# Patient Record
Sex: FEMALE | Race: WHITE | NOT HISPANIC OR LATINO | Employment: FULL TIME | RURAL
[De-identification: names, ages, dates, MRNs, and addresses within clinical notes are randomized per-mention and may not be internally consistent; named-entity substitution may affect disease eponyms.]

---

## 2017-01-23 ENCOUNTER — PROCEDURE VISIT (OUTPATIENT)
Dept: FAMILY MEDICINE CLINIC | Facility: CLINIC | Age: 57
End: 2017-01-23

## 2017-01-23 VITALS
TEMPERATURE: 97.9 F | HEIGHT: 66 IN | HEART RATE: 77 BPM | WEIGHT: 220 LBS | SYSTOLIC BLOOD PRESSURE: 146 MMHG | DIASTOLIC BLOOD PRESSURE: 90 MMHG | RESPIRATION RATE: 16 BRPM | BODY MASS INDEX: 35.36 KG/M2

## 2017-01-23 DIAGNOSIS — Z23 NEED FOR VACCINATION: ICD-10-CM

## 2017-01-23 DIAGNOSIS — Z12.4 PAP SMEAR FOR CERVICAL CANCER SCREENING: ICD-10-CM

## 2017-01-23 DIAGNOSIS — Z11.59 NEED FOR HEPATITIS C SCREENING TEST: ICD-10-CM

## 2017-01-23 DIAGNOSIS — M79.601 RIGHT ARM PAIN: Primary | ICD-10-CM

## 2017-01-23 PROCEDURE — 99213 OFFICE O/P EST LOW 20 MIN: CPT | Performed by: NURSE PRACTITIONER

## 2017-01-23 PROCEDURE — 36415 COLL VENOUS BLD VENIPUNCTURE: CPT | Performed by: NURSE PRACTITIONER

## 2017-01-23 PROCEDURE — 99214 OFFICE O/P EST MOD 30 MIN: CPT | Performed by: NURSE PRACTITIONER

## 2017-01-23 PROCEDURE — 90471 IMMUNIZATION ADMIN: CPT | Performed by: NURSE PRACTITIONER

## 2017-01-23 PROCEDURE — 90715 TDAP VACCINE 7 YRS/> IM: CPT | Performed by: NURSE PRACTITIONER

## 2017-01-23 PROCEDURE — 88142 CYTOPATH C/V THIN LAYER: CPT | Performed by: NURSE PRACTITIONER

## 2017-01-23 PROCEDURE — 86803 HEPATITIS C AB TEST: CPT | Performed by: NURSE PRACTITIONER

## 2017-01-23 RX ORDER — METHYLPREDNISOLONE 4 MG/1
TABLET ORAL
Qty: 21 TABLET | Refills: 0 | Status: SHIPPED | OUTPATIENT
Start: 2017-01-23 | End: 2017-10-02

## 2017-01-23 NOTE — PROGRESS NOTES
Subjective   History of Present Illness    Goldie Kraus is a 56 y.o. female who presents for annual exam.  She also has c/o right shoulder and upper arm pain.  Obstetric History:   5, para 5, living children 5, full term 5   Menstrual History:     Patient's last menstrual period was 2010.       Sexual History:   sexually active      Current contraception: none  History of abnormal Pap smear: yes - had ascus and was repeated and was normal on repeat.  SILAS exposure in utero: no  Received Gardasil immunization: no  Perform regular self breast exam: yes - monthly  Family history of uterine or ovarian cancer: no  Family History of cervical cancer: no  Family History of colon cancer/colon polyps: yes - possible colon cancer in grandfather  Regular self breast exam: yes  History of abnormal mammogram: no  Family history of breast cancer: no  History of abnormal lipids: no    The following portions of the patient's history were reviewed and updated as appropriate: allergies, current medications, past family history, past medical history, past social history, past surgical history and problem list.    Review of Systems   Constitutional: Negative.    Respiratory: Negative.    Cardiovascular: Negative.    Gastrointestinal: Negative.    Genitourinary: Negative for pelvic pain, vaginal discharge and vaginal pain.   Musculoskeletal: Positive for arthralgias (right shoulder and upper arm pain).   Skin: Negative.    Neurological: Negative.    Psychiatric/Behavioral: Negative.        Pertinent items are noted in HPI.     Objective   Physical Exam   Constitutional: She appears well-developed and well-nourished. No distress.   Genitourinary: Rectal exam shows guaiac negative stool.   Musculoskeletal:        Right shoulder: She exhibits decreased range of motion, tenderness, crepitus and pain. She exhibits no swelling, no effusion and no deformity.   Nursing note and vitals reviewed.      Visit Vitals   • /90   •  "Pulse 77   • Temp 97.9 °F (36.6 °C)   • Resp 16   • Ht 66\" (167.6 cm)   • Wt 220 lb (99.8 kg)   • LMP 05/23/2010   • BMI 35.51 kg/m2       General:   alert, appears stated age and cooperative   Heart: regular rate and rhythm, S1, S2 normal, no murmur, click, rub or gallop   Lungs: clear to auscultation bilaterally   Abdomen: soft, non-tender, without masses or organomegaly   Breast: inspection negative, no nipple discharge or bleeding, no masses or nodularity palpable   Vulva: normal   Vagina: normal mucosa   Cervix: no lesions   Uterus: normal size   Adnexa: normal adnexa     Assessment/Plan   Goldie was seen today for gynecologic exam.    Diagnoses and all orders for this visit:    Right arm pain  -     MethylPREDNISolone (MEDROL, LAZARO,) 4 MG tablet; Take as directed on package instructions.    Need for hepatitis C screening test  -     Hepatitis C Antibody    Need for vaccination  -     Tdap Vaccine Greater Than or Equal To 6yo IM      gyn exam is completed.  she will be scheduled for a mammogram and she will be given medrol for her arm/shoulder pain             "

## 2017-01-24 LAB
HCV AB SER DONR QL: NEGATIVE
HCV S/C RATIO: 0.02 (ref 0–0.89)

## 2017-01-26 LAB
HPV REFLEX?: NORMAL
LAB AP CASE REPORT: NORMAL
LAB AP GYN OTHER FINDINGS: NORMAL
Lab: NORMAL
PATH INTERP SPEC-IMP: NORMAL
STAT OF ADQ CVX/VAG CYTO-IMP: NORMAL

## 2017-03-23 RX ORDER — OMEPRAZOLE 40 MG/1
CAPSULE, DELAYED RELEASE ORAL
Qty: 30 CAPSULE | Refills: 3 | Status: SHIPPED | OUTPATIENT
Start: 2017-03-23 | End: 2017-10-02 | Stop reason: SDUPTHER

## 2017-04-12 RX ORDER — LOSARTAN POTASSIUM 50 MG/1
50 TABLET ORAL DAILY
Qty: 30 TABLET | Refills: 3 | Status: SHIPPED | OUTPATIENT
Start: 2017-04-12 | End: 2017-10-02 | Stop reason: SDUPTHER

## 2017-08-01 RX ORDER — LOSARTAN POTASSIUM 50 MG/1
TABLET ORAL
Qty: 30 TABLET | Refills: 3 | OUTPATIENT
Start: 2017-08-01

## 2017-08-07 ENCOUNTER — TELEPHONE (OUTPATIENT)
Dept: FAMILY MEDICINE CLINIC | Facility: CLINIC | Age: 57
End: 2017-08-07

## 2017-09-01 RX ORDER — OMEPRAZOLE 40 MG/1
CAPSULE, DELAYED RELEASE ORAL
Qty: 30 CAPSULE | Refills: 3 | OUTPATIENT
Start: 2017-09-01

## 2017-09-12 RX ORDER — OMEPRAZOLE 40 MG/1
CAPSULE, DELAYED RELEASE ORAL
Qty: 30 CAPSULE | Refills: 3 | OUTPATIENT
Start: 2017-09-12

## 2017-09-12 RX ORDER — LOSARTAN POTASSIUM 50 MG/1
TABLET ORAL
Qty: 30 TABLET | Refills: 3 | OUTPATIENT
Start: 2017-09-12

## 2017-10-02 ENCOUNTER — OFFICE VISIT (OUTPATIENT)
Dept: FAMILY MEDICINE CLINIC | Facility: CLINIC | Age: 57
End: 2017-10-02

## 2017-10-02 VITALS
HEIGHT: 66 IN | DIASTOLIC BLOOD PRESSURE: 72 MMHG | HEART RATE: 107 BPM | WEIGHT: 217 LBS | RESPIRATION RATE: 16 BRPM | TEMPERATURE: 98.7 F | OXYGEN SATURATION: 98 % | SYSTOLIC BLOOD PRESSURE: 127 MMHG | BODY MASS INDEX: 34.87 KG/M2

## 2017-10-02 DIAGNOSIS — M54.2 NECK PAIN: Primary | ICD-10-CM

## 2017-10-02 DIAGNOSIS — I10 ESSENTIAL HYPERTENSION: ICD-10-CM

## 2017-10-02 DIAGNOSIS — K21.9 GASTROESOPHAGEAL REFLUX DISEASE, ESOPHAGITIS PRESENCE NOT SPECIFIED: ICD-10-CM

## 2017-10-02 PROCEDURE — 99214 OFFICE O/P EST MOD 30 MIN: CPT | Performed by: NURSE PRACTITIONER

## 2017-10-02 RX ORDER — OMEPRAZOLE 40 MG/1
40 CAPSULE, DELAYED RELEASE ORAL DAILY
Qty: 30 CAPSULE | Refills: 3 | Status: SHIPPED | OUTPATIENT
Start: 2017-10-02 | End: 2018-01-26 | Stop reason: SDUPTHER

## 2017-10-02 RX ORDER — TIZANIDINE HYDROCHLORIDE 2 MG/1
2 CAPSULE, GELATIN COATED ORAL 3 TIMES DAILY
Qty: 90 CAPSULE | Refills: 1 | Status: SHIPPED | OUTPATIENT
Start: 2017-10-02 | End: 2017-10-05 | Stop reason: CLARIF

## 2017-10-02 RX ORDER — LOSARTAN POTASSIUM 50 MG/1
50 TABLET ORAL DAILY
Qty: 30 TABLET | Refills: 5 | Status: SHIPPED | OUTPATIENT
Start: 2017-10-02 | End: 2018-03-28 | Stop reason: SDUPTHER

## 2017-10-02 RX ORDER — DICLOFENAC SODIUM 75 MG/1
75 TABLET, DELAYED RELEASE ORAL 2 TIMES DAILY
Qty: 60 TABLET | Refills: 3 | Status: SHIPPED | OUTPATIENT
Start: 2017-10-02 | End: 2018-08-17 | Stop reason: SDUPTHER

## 2017-10-02 NOTE — PROGRESS NOTES
Subjective   Goldie Kraus is a 57 y.o. female.     HPI Comments: Here today stating that she has chronic neck pain that radiates down into her right shoulder.  Has been going on for the past 10+ years.  Is not taking anything for sx at present.  Also she needs refills on her meds.    Hypertension   This is a chronic problem. The current episode started more than 1 year ago. The problem is unchanged. The problem is controlled. Associated symptoms include neck pain. Pertinent negatives include no anxiety, blurred vision, chest pain, headaches, malaise/fatigue, orthopnea, palpitations, peripheral edema, PND, shortness of breath or sweats. There are no associated agents to hypertension. Risk factors for coronary artery disease include obesity, post-menopausal state, sedentary lifestyle and smoking/tobacco exposure. Past treatments include angiotensin blockers. The current treatment provides significant improvement. There are no compliance problems.  There is no history of angina, kidney disease, CAD/MI, CVA, heart failure, left ventricular hypertrophy, PVD or retinopathy.   Neck Pain    This is a chronic problem. The current episode started more than 1 year ago. The problem occurs constantly. The problem has been unchanged. The pain is present in the anterior neck. The quality of the pain is described as aching. The pain is at a severity of 5/10. The pain is moderate. Nothing aggravates the symptoms. The pain is same all the time. Stiffness is present in the morning. Pertinent negatives include no chest pain, fever, headaches, leg pain, numbness, pain with swallowing, paresis, photophobia, syncope, tingling, trouble swallowing, visual change, weakness or weight loss. She has tried nothing for the symptoms. The treatment provided no relief.        The following portions of the patient's history were reviewed and updated as appropriate: allergies, current medications, past family history, past medical history, past  social history, past surgical history and problem list.    Review of Systems   Constitutional: Negative.  Negative for fever, malaise/fatigue and weight loss.   HENT: Negative.  Negative for trouble swallowing.    Eyes: Negative for blurred vision and photophobia.   Respiratory: Negative.  Negative for shortness of breath.    Cardiovascular: Negative.  Negative for chest pain, palpitations, orthopnea, syncope and PND.   Musculoskeletal: Positive for neck pain.   Skin: Negative.    Neurological: Negative.  Negative for tingling, weakness, numbness and headaches.   Psychiatric/Behavioral: Negative.        Objective   Physical Exam   Constitutional: She is oriented to person, place, and time. She appears well-developed and well-nourished. No distress.   HENT:   Head: Normocephalic.   Eyes: Pupils are equal, round, and reactive to light.   Neck: Normal range of motion. Neck supple. No thyromegaly present.   Cardiovascular: Normal rate, regular rhythm and normal heart sounds.  Exam reveals no friction rub.    No murmur heard.  Pulmonary/Chest: Effort normal and breath sounds normal. No respiratory distress. She has no wheezes. She has no rales.   Abdominal: Soft.   Musculoskeletal:        Cervical back: She exhibits decreased range of motion, pain and spasm. She exhibits no tenderness.   X rays are reviewed and show degenerative changes.   Neurological: She is alert and oriented to person, place, and time.   Skin: Skin is warm and dry.   Psychiatric: She has a normal mood and affect. Thought content normal.   Nursing note and vitals reviewed.      Assessment/Plan   Goldie was seen today for right shoulder pain, hypertension and heartburn.    Diagnoses and all orders for this visit:    Neck pain  -     XR Spine Cervical Complete 4 or 5 View (In Office)  -     diclofenac (VOLTAREN) 75 MG EC tablet; Take 1 tablet by mouth 2 (Two) Times a Day.  -     TiZANidine (ZANAFLEX) 2 MG capsule; Take 1 capsule by mouth 3 (Three) Times  a Day.  -     Ambulatory Referral to Physical Therapy Evaluate and treat    Essential hypertension  -     losartan (COZAAR) 50 MG tablet; Take 1 tablet by mouth Daily.  -     Comprehensive Metabolic Panel; Future  -     Lipid panel; Future    Gastroesophageal reflux disease, esophagitis presence not specified  -     omeprazole (priLOSEC) 40 MG capsule; Take 1 capsule by mouth Daily.

## 2017-10-03 ENCOUNTER — TELEPHONE (OUTPATIENT)
Dept: FAMILY MEDICINE CLINIC | Facility: CLINIC | Age: 57
End: 2017-10-03

## 2017-10-05 RX ORDER — CYCLOBENZAPRINE HCL 5 MG
5 TABLET ORAL 3 TIMES DAILY PRN
Qty: 90 TABLET | Refills: 3 | Status: SHIPPED | OUTPATIENT
Start: 2017-10-05 | End: 2017-12-07 | Stop reason: DRUGHIGH

## 2017-10-11 ENCOUNTER — APPOINTMENT (OUTPATIENT)
Dept: PHYSICAL THERAPY | Facility: HOSPITAL | Age: 57
End: 2017-10-11

## 2017-10-16 ENCOUNTER — LAB (OUTPATIENT)
Dept: LAB | Facility: CLINIC | Age: 57
End: 2017-10-16

## 2017-10-16 DIAGNOSIS — I10 ESSENTIAL HYPERTENSION: ICD-10-CM

## 2017-10-16 LAB
ALBUMIN SERPL-MCNC: 4.1 G/DL (ref 3.4–4.8)
ALBUMIN/GLOB SERPL: 1.6 G/DL (ref 1.1–1.8)
ALP SERPL-CCNC: 96 U/L (ref 38–126)
ALT SERPL W P-5'-P-CCNC: 23 U/L (ref 9–52)
ANION GAP SERPL CALCULATED.3IONS-SCNC: 10 MMOL/L (ref 5–15)
ARTICHOKE IGE QN: 214 MG/DL (ref 1–129)
AST SERPL-CCNC: 16 U/L (ref 14–36)
BILIRUB SERPL-MCNC: 1 MG/DL (ref 0.2–1.3)
BUN BLD-MCNC: 14 MG/DL (ref 7–21)
BUN/CREAT SERPL: 15.7 (ref 7–25)
CALCIUM SPEC-SCNC: 9.7 MG/DL (ref 8.4–10.2)
CHLORIDE SERPL-SCNC: 104 MMOL/L (ref 95–110)
CHOLEST SERPL-MCNC: 278 MG/DL (ref 0–199)
CO2 SERPL-SCNC: 25 MMOL/L (ref 22–31)
CREAT BLD-MCNC: 0.89 MG/DL (ref 0.5–1)
GFR SERPL CREATININE-BSD FRML MDRD: 66 ML/MIN/1.73 (ref 51–120)
GLOBULIN UR ELPH-MCNC: 2.5 GM/DL (ref 2.3–3.5)
GLUCOSE BLD-MCNC: 93 MG/DL (ref 60–100)
HDLC SERPL-MCNC: 45 MG/DL (ref 60–200)
LDLC/HDLC SERPL: 4.57 {RATIO} (ref 0–3.22)
POTASSIUM BLD-SCNC: 4.9 MMOL/L (ref 3.5–5.1)
PROT SERPL-MCNC: 6.6 G/DL (ref 6.3–8.6)
SODIUM BLD-SCNC: 139 MMOL/L (ref 137–145)
TRIGL SERPL-MCNC: 137 MG/DL (ref 20–199)

## 2017-10-16 PROCEDURE — 80061 LIPID PANEL: CPT | Performed by: NURSE PRACTITIONER

## 2017-10-16 PROCEDURE — 36415 COLL VENOUS BLD VENIPUNCTURE: CPT | Performed by: NURSE PRACTITIONER

## 2017-10-16 PROCEDURE — 80053 COMPREHEN METABOLIC PANEL: CPT | Performed by: NURSE PRACTITIONER

## 2017-10-17 ENCOUNTER — HOSPITAL ENCOUNTER (OUTPATIENT)
Dept: PHYSICAL THERAPY | Facility: HOSPITAL | Age: 57
Setting detail: THERAPIES SERIES
Discharge: HOME | End: 2017-10-17

## 2017-10-17 DIAGNOSIS — M79.601 RIGHT ARM PAIN: Primary | ICD-10-CM

## 2017-10-17 DIAGNOSIS — M54.2 NECK PAIN: ICD-10-CM

## 2017-10-17 PROCEDURE — 97110 THERAPEUTIC EXERCISES: CPT | Performed by: PHYSICAL THERAPIST

## 2017-10-17 PROCEDURE — 97162 PT EVAL MOD COMPLEX 30 MIN: CPT | Performed by: PHYSICAL THERAPIST

## 2017-10-17 NOTE — THERAPY EVALUATION
Outpatient Physical Therapy Ortho Initial Evaluation  [facility]  Marjan Tenorio, PT, DPT, CSCS       Patient Name: Goldie Kraus  : 1960  MRN: [id number]  Today's Date: 10/17/2017      Visit Date: 10/17/2017     Pt reports 0/10 pain pre treatment, 0/10 pain post treatment  Reports N/A% of improvement.  Attended  visits.  Insurance available: 6 visits  Next MD appt: CATHY .  Recertification: 2017.    Patient Active Problem List   Diagnosis   • Need for vaccination   • Need for hepatitis C screening test   • Right arm pain        Past Medical History:   Diagnosis Date   • Allergic contact dermatitis    • Chest pain    • Difficulty swallowing    • Essential hypertension    • Gastroesophageal reflux disease    • Non-cardiac chest pain    • Tinnitus    • Vaginitis         Past Surgical History:   Procedure Laterality Date   • CARDIAC CATHETERIZATION  2015    Noncritical CAD with no flow limiting lesions at this time. Normal LV systolic function with Ef of 60% with no wall motion abnormalities.       Visit Dx:     ICD-10-CM ICD-9-CM   1. Right arm pain M79.601 729.5   2. Neck pain M54.2 723.1     Number of days off work: None    Patient is     Patient has 4 children, 2 grown, and 2 at home 12 and 15 years old.    Allergies        Erythromycin     Chauncey as Reviewed Reviewed by You at 3:36 PM.   Medications (Admitted on 10/17/2017)     Outpatient Medications     cyclobenzaprine (FLEXERIL) 5 MG tablet      diclofenac (VOLTAREN) 75 MG EC tablet      losartan (COZAAR) 50 MG tablet      omeprazole (priLOSEC) 40 MG capsule              Patient History       10/17/17 1500          History    Chief Complaint Pain  -AJ      Type of Pain Neck pain;Upper Extremity / Arm  -AJ      Date Current Problem(s) Began --   Chronic, since   -AJ      Brief Description of Current Complaint Patient reports she has had issues since , then 17 years later she had issues  from work and went to a chiropractor on her own and fired her neurologist. She reports she got some steroids to help her arm pain, which helped some. She reports she also had an xray.  -AJ      Previous treatment for THIS PROBLEM Chiropractor;Medication  -AJ      Patient/Caregiver Goals Relieve pain;Improve mobility  -AJ      Current Tobacco Use ~1ppd  -AJ      Smoking Status Daily Smoker  -AJ      Patient's Rating of General Health Fair  -AJ      Hand Dominance left-handed  -AJ      Occupation/sports/leisure activities Occupation: [employer] at [employer]  -AJ      What clinical tests have you had for this problem? 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  -AJ      What Performance Factors Make the Current Problem(s) BETTER? Keeping arm close to body  -AJ      Is your sleep disturbed? Yes  -AJ      Is medication used to assist with sleep? Yes  -AJ      What position do you sleep in? 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 Can't play basketball with grandson.  -AJ        User Key  (r) = Recorded By, (t) = Taken By, (c) = Cosigned By    Initials Name Provider Type    AJ Marjan Tenorio PT Physical Therapist                PT Ortho       10/17/17 1500    Subjective Comments    Subjective Comments Patient wishes to get the pain to go away and  "avoid surgery.  -    Subjective Pain    Able to rate subjective pain? yes  -    Pre-Treatment Pain Level 0  -    Post-Treatment Pain Level 0   \"numb\"  -    Posture/Observations    Alignment Options Forward head;Cervical lordosis;Thoracic kyphosis;Rounded shoulders;Scapular elevation;Scapular winging  -AJ    Forward Head Mild;Increased  -AJ    Cervical Lordosis Mild;Decreased  -AJ    Thoracic Kyphosis Mild;Increased  -AJ    Rounded Shoulders Bilateral:;Mild;Increased  -AJ    Scapular Elevation Right:;Mild;Elevated  -AJ    Scapular winging Bilateral:;Mild;Increased  -AJ    Posture/Observations Comments No acute distress  -    DTR- Upper Quarter Clearing    Biceps (C5/6) Bilateral:;2- Normal response  -AJ    Triceps (C7) Bilateral:;2- Normal response  -AJ    Cervical/Thoracic Special Tests    Spurlings (Foraminal Compression) Negative  -    Cervical Compression (Forarminal Compression vs. Facet Pain) Right:;Positive;Left:;Negative  -AJ    Cervical Distraction (Foraminal Compression vs. Facet Pain) Bilateral:;Negative  -AJ    Transverse Ligament (Instability) Negative  -    Vertebral Artery Test (VBI Sign) Bilateral:;Negative  -AJ    Neck    Flexion AROM --   50°  -AJ    Extension AROM --   58°  -AJ    Left Lateral Flexion AROM --   42°  -AJ    Right Lateral Flexion AROM --   38°  -AJ    Left Rotation AROM --   70°  -AJ    Right Rotation AROM --   65°  -AJ    MMT (Manual Muscle Testing)    General MMT Assessment Detail L UE 5/5, R shoulder flex/abd 4/5 and painfil, rest of R UE 5/5; C-spine 4+/5  -    Transfers    Transfer, Comment I with all transfers  -    Gait Assessment/Treatment    Gait, [place] Level independent  -    Gait, Comment FWB, non-antalgic gait, no arm swing on R with gait.  -      User Key  (r) = Recorded By, (t) = Taken By, (c) = Cosigned By    Initials Name Provider Type     Marjan Tenorio PT Physical Therapist                Therapy Education       10/17/17 1600    "       Therapy Education    Given HEP;Symptoms/condition management;Pain management;Posture/body mechanics  -AJ      Program New  -AJ      How Provided Verbal;Demonstration;Written  -AJ      Provided to Patient  -AJ      Level of Understanding Verbalized;Demonstrated  -AJ        User Key  (r) = Recorded By, (t) = Taken By, (c) = Cosigned By    Initials Name Provider Type    ANNMARIE Tenorio, PT Physical Therapist                PT OP Goals       10/17/17 1500       PT Short Term Goals    STG Date to Achieve 11/07/17  -AJ     STG 1 I with HEp and have additions/changes by next recertification.  -AJ     STG 2 Patient to report her rad pain is come/go versus constant now in R UE.  -AJ     STG 3 AROm B cervical ROT >= 75°.  -AJ     STG 4 R UE 4+/5 or greater for all.  -AJ     STG 5 Patient to be more aware of posture and posture correction technique.  -     Long Term Goals    LTG Date to Achieve 11/10/17  -AJ     LTG 1 AROM for cervical spine all WNL with no increase in pain.  -AJ     LTG 2 B UE 5/5.  -AJ     LTG 3 Cervical spine 5/5 with no pain.  -AJ     LTG 4 Patient able to control rad s/s with ther ex.  -AJ     LTG 5 I with final HEP.  -AJ     LTG 6 D/C with free 30 day fitness [employer] membership.  -     Time Calculation    PT Goal Re-Cert Due Date 11/07/17  -       User Key  (r) = Recorded By, (t) = Taken By, (c) = Cosigned By    Initials Name Provider Type    ANNMARIE Tenorio, PT Physical Therapist        Barriers to Rehab: Include significant or possible arthritic/degenerative changes that have occurred within the spine,     Safety Issues: None noted.          PT Assessment/Plan       10/17/17 1500       PT Assessment    Functional Limitations Limitation in home management;Limitations in community activities;Performance in leisure activities;Performance in self-care ADL;Performance in work activities  -     Impairments Edema;Endurance;Impaired flexibility;Joint integrity;Joint  "mobility;Muscle strength;Pain;Peripheral nerve integrity;Poor body mechanics;Posture;Range of motion  -     Assessment Comments Patient did well with all ther ex and given written copy of HEP exercises.  -AJ     Rehab Potential Fair  -AJ     Patient/caregiver participated in establishment of treatment plan and goals Yes  -AJ     Patient would benefit from skilled therapy intervention Yes  -AJ     PT Plan    PT Frequency 2x/week  -AJ     Predicted Duration of Therapy Intervention (days/wks) 3-4 weeks, 6-8 visits  -AJ     Planned CPT's? PT EVAL MOD COMPLELITY: 99450;PT RE-EVAL: 86694;PT THER PROC EA 15 MIN: 38033;PT THER ACT EA 15 MIN: 16971;PT MANUAL THERAPY EA 15 MIN: 00503;PT ELECTRICAL STIM UNATTEND: ;PT THER SUPP EA 15 MIN  -AJ     Physical Therapy Interventions (Optional Details) home exercise program;joint mobilization;manual therapy techniques;modalities;neuromuscular re-education;patient/family education;postural re-education;ROM (Range of Motion);strengthening;stretching  -AJ     PT Plan Comments Advance scap stabalization.  -AJ       User Key  (r) = Recorded By, (t) = Taken By, (c) = Cosigned By    Initials Name Provider Type    ANNMARIE Tenorio, PT Physical Therapist       Other therapeutic activities and/or exercises will be prescribed depending on the patients progress or lack there of.          Modalities       10/17/17 1500          Ice    Ice Applied Yes  -AJ      Location C-spine  -AJ      Rx Minutes 15 mins  -AJ      Ice S/P Rx Yes  -AJ        User Key  (r) = Recorded By, (t) = Taken By, (c) = Cosigned By    Initials Name Provider Type    ANNMARIE Tenorio, PT Physical Therapist              Exercises       10/17/17 1500          Subjective Comments    Subjective Comments Patient wishes to get the pain to go away and avoid surgery.  -AJ      Subjective Pain    Able to rate subjective pain? yes  -AJ      Pre-Treatment Pain Level 0  -AJ      Post-Treatment Pain Level 0   \"numb\"  " "-AJ      Exercise 1    Exercise Name 1 B UT S  -AJ      Reps 1 2  -AJ      Time (Seconds) 1 30 seconds  -AJ      Exercise 2    Exercise Name 2 B LS S  -AJ      Reps 2 2  -AJ      Time (Seconds) 2 30 seconds  -AJ      Exercise 3    Exercise Name 3 Chin Tucks  -AJ      Sets 3 2  -AJ      Reps 3 10  -AJ      Time (Seconds) 3 5\" hold  -AJ      Exercise 4    Exercise Name 4 Swims  -AJ      Reps 4 10  -AJ        User Key  (r) = Recorded By, (t) = Taken By, (c) = Cosigned By    Initials Name Provider Type    ANNMARIE Tenorio PT Physical Therapist                              Outcome Measures       10/17/17 1600          Neck Disability Index    Section 1 - Pain Intensity 4  -AJ      Section 2 - Personal Care 2  -AJ      Section 3 - Lifting 2  -AJ      Section 4 - Work 2  -AJ      Section 5 - Headaches 0  -AJ      Section 6 - Concentration 0  -AJ      Section 7 - Sleeping 3  -AJ      Section 8 - Driving 2  -AJ      Section 9 - Reading 1  -AJ      Section 10 - Recreation 2  -AJ      Neck Disability Index Score 18  -AJ      Neck Disability Index Comments 36%  -      Functional Assessment    Outcome Measure Options Neck Disability Index (NDI)  -        User Key  (r) = Recorded By, (t) = Taken By, (c) = Cosigned By    Initials Name Provider Type    ANNMARIE Tenorio PT Physical Therapist            Time Calculation:   Start Time: 1530  Stop Time: 1620  Time Calculation (min): 50 min  PT Non-Billable Time (min): 15 min  Total Timed Code Minutes- PT: 18 minute(s)     Therapy Charges for Today     Code Description Service Date Service Provider Modifiers Qty    07448311328 HC PT EVAL MOD COMPLEXITY 2 10/17/2017 Marjan Tenorio, PT GP 1    76815099065 HC PT THER PROC EA 15 MIN 10/17/2017 Marjan Tenorio PT GP 1    76536929775 HC PT THER SUPP EA 15 MIN 10/17/2017 Marjan Tenorio, PT GP 1          PT G-Codes  Outcome Measure Options: Neck Disability Index (NDI)         Marjan Tenorio, PT, DPT, " CSCS  10/17/2017

## 2017-10-18 ENCOUNTER — TELEPHONE (OUTPATIENT)
Dept: FAMILY MEDICINE CLINIC | Facility: CLINIC | Age: 57
End: 2017-10-18

## 2017-10-18 DIAGNOSIS — E78.2 MIXED HYPERLIPIDEMIA: Primary | ICD-10-CM

## 2017-10-19 ENCOUNTER — HOSPITAL ENCOUNTER (OUTPATIENT)
Dept: PHYSICAL THERAPY | Facility: HOSPITAL | Age: 57
Setting detail: THERAPIES SERIES
Discharge: HOME | End: 2017-10-19

## 2017-10-19 DIAGNOSIS — M79.601 RIGHT ARM PAIN: Primary | ICD-10-CM

## 2017-10-19 DIAGNOSIS — M54.2 NECK PAIN: ICD-10-CM

## 2017-10-19 PROCEDURE — 97110 THERAPEUTIC EXERCISES: CPT | Performed by: PHYSICAL THERAPIST

## 2017-10-19 NOTE — THERAPY TREATMENT NOTE
"    Outpatient Physical Therapy Ortho Treatment Note  [facility]  Marjan Tenorio, PT, DPT, CSCS       Patient Name: Goldie Kraus  : 1960  MRN: [id number]  Today's Date: 10/19/2017      Visit Date: 10/19/2017     Pt reports 4/10 pain pre treatment, 5/10 pain post treatment  Reports % of improvement.  Attended 2/2 visits.  Insurance available: 6 visits  Next MD appt: CATHY .  Recertification: 2017.    Visit Dx:    ICD-10-CM ICD-9-CM   1. Right arm pain M79.601 729.5   2. Neck pain M54.2 723.1       Patient Active Problem List   Diagnosis   • Need for vaccination   • Need for hepatitis C screening test   • Right arm pain        Past Medical History:   Diagnosis Date   • Allergic contact dermatitis    • Chest pain    • Difficulty swallowing    • Essential hypertension    • Gastroesophageal reflux disease    • Non-cardiac chest pain    • Tinnitus    • Vaginitis         Past Surgical History:   Procedure Laterality Date   • CARDIAC CATHETERIZATION  2015    Noncritical CAD with no flow limiting lesions at this time. Normal LV systolic function with Ef of 60% with no wall motion abnormalities.             PT Ortho       10/19/17 1300    Subjective Comments    Subjective Comments Patient reports she is hurting pretty good today in the arm. She reports she took a flexaril.  -    Subjective Pain    Able to rate subjective pain? yes  -    Pre-Treatment Pain Level 4   arm  -    Post-Treatment Pain Level 5   arm; neck  \"numb\"  -    Posture/Observations    Posture/Observations Comments No acute distress.  -AJ      10/17/17 1500    Subjective Comments    Subjective Comments Patient wishes to get the pain to go away and avoid surgery.  -    Subjective Pain    Able to rate subjective pain? yes  -    Pre-Treatment Pain Level 0  -    Post-Treatment Pain Level 0   \"numb\"  -    Posture/Observations    Alignment Options Forward head;Cervical lordosis;Thoracic " kyphosis;Rounded shoulders;Scapular elevation;Scapular winging  -AJ    Forward Head Mild;Increased  -AJ    Cervical Lordosis Mild;Decreased  -AJ    Thoracic Kyphosis Mild;Increased  -AJ    Rounded Shoulders Bilateral:;Mild;Increased  -AJ    Scapular Elevation Right:;Mild;Elevated  -AJ    Scapular winging Bilateral:;Mild;Increased  -AJ    Posture/Observations Comments No acute distress  -    DTR- Upper Quarter Clearing    Biceps (C5/6) Bilateral:;2- Normal response  -AJ    Triceps (C7) Bilateral:;2- Normal response  -AJ    Cervical/Thoracic Special Tests    Spurlings (Foraminal Compression) Negative  -AJ    Cervical Compression (Forarminal Compression vs. Facet Pain) Right:;Positive;Left:;Negative  -AJ    Cervical Distraction (Foraminal Compression vs. Facet Pain) Bilateral:;Negative  -AJ    Transverse Ligament (Instability) Negative  -AJ    Vertebral Artery Test (VBI Sign) Bilateral:;Negative  -AJ    Neck    Flexion AROM --   50°  -AJ    Extension AROM --   58°  -AJ    Left Lateral Flexion AROM --   42°  -AJ    Right Lateral Flexion AROM --   38°  -AJ    Left Rotation AROM --   70°  -AJ    Right Rotation AROM --   65°  -AJ    MMT (Manual Muscle Testing)    General MMT Assessment Detail L UE 5/5, R shoulder flex/abd 4/5 and painfil, rest of R UE 5/5; C-spine 4+/5  -    Transfers    Transfer, Comment I with all transfers  -    Gait Assessment/Treatment    Gait, [place] Level independent  -    Gait, Comment FWB, non-antalgic gait, no arm swing on R with gait.  -      User Key  (r) = Recorded By, (t) = Taken By, (c) = Cosigned By    Initials Name Provider Type    AJ Marjan Tenorio, PT Physical Therapist                            PT Assessment/Plan       10/19/17 1300       PT Assessment    Assessment Comments Patient required visual cues and tactile cueing to not hike R shoulder with swims, reverse swims, and with scap squeezes.  -     PT Plan    PT Frequency 2x/week  -     PT Plan Comments Add  "RTB shoulder ext, ensure patient is not shoulder hiking.  -AJ       User Key  (r) = Recorded By, (t) = Taken By, (c) = Cosigned By    Initials Name Provider Type    AJ Marjan Tenorio, PT Physical Therapist                Modalities       10/19/17 1300          Ice    Ice Applied Yes  -AJ      Location C-spine  -AJ      Rx Minutes 15 mins  -AJ      Ice S/P Rx Yes  -AJ        User Key  (r) = Recorded By, (t) = Taken By, (c) = Cosigned By    Initials Name Provider Type    AJ Marjan Tenorio, PT Physical Therapist                Exercises       10/19/17 1300          Subjective Comments    Subjective Comments Patient reports she is hurting pretty good today in the arm. She reports she took a flexaril.  -AJ      Subjective Pain    Able to rate subjective pain? yes  -AJ      Pre-Treatment Pain Level 4   arm  -AJ      Post-Treatment Pain Level 5   arm; neck  \"numb\"  -AJ      Exercise 1    Exercise Name 1 Pro II UE F/R  -AJ      Time (Minutes) 1 10 minutes  -AJ      Additional Comments L 3.0  -AJ      Exercise 2    Exercise Name 2 B UT S  -AJ      Reps 2 2  -AJ      Time (Seconds) 2 30 seconds  -AJ      Exercise 3    Exercise Name 3 B LS S  -AJ      Reps 3 2  -AJ      Time (Seconds) 3 30 seconds  -AJ      Exercise 4    Exercise Name 4 Posterior shoulder rolls between exercises  -AJ      Reps 4 10  -AJ      Exercise 5    Exercise Name 5 Alt SB cervical isometrics  -AJ      Reps 5 10  -AJ      Time (Seconds) 5 5\" hold  -AJ      Exercise 6    Exercise Name 6 Chin Tucks  -AJ      Sets 6 2  -AJ      Reps 6 10  -AJ      Time (Seconds) 6 5\" hold  -AJ      Exercise 7    Exercise Name 7 Swims  -AJ      Cueing 7 --   Visual  -AJ      Reps 7 10  -AJ      Exercise 8    Exercise Name 8 Reverse swims  -AJ      Cueing 8 --   Visual  -AJ      Reps 8 10  -AJ      Exercise 9    Exercise Name 9 Scap squeezes.  -AJ      Cueing 9 Tactile  -AJ      Sets 9 2  -AJ      Reps 9 10  -AJ      Time (Seconds) 9 5\" hold  -AJ        User Key  " (r) = Recorded By, (t) = Taken By, (c) = Cosigned By    Initials Name Provider Type    ANNMARIE Tenorio PT Physical Therapist                               PT OP Goals       10/19/17 1300       PT Short Term Goals    STG Date to Achieve 11/07/17  -     STG 1 I with HEp and have additions/changes by next recertification.  -     STG 1 Progress Ongoing  -     STG 2 Patient to report her rad pain is come/go versus constant now in R UE.  -AJ     STG 2 Progress Ongoing  -     STG 3 AROm B cervical ROT >= 75°.  -AJ     STG 3 Progress Ongoing  -     STG 4 R UE 4+/5 or greater for all.  -     STG 4 Progress Ongoing  -     STG 5 Patient to be more aware of posture and posture correction technique.  -     STG 5 Progress Ongoing  -     Long Term Goals    LTG Date to Achieve 11/10/17  -     LTG 1 AROM for cervical spine all WNL with no increase in pain.  -     LTG 2 B UE 5/5.  -     LTG 3 Cervical spine 5/5 with no pain.  -     LTG 4 Patient able to control rad s/s with ther ex.  -     LTG 5 I with final HEP.  -     LTG 6 D/C with free 30 day [employer] membership.  -       User Key  (r) = Recorded By, (t) = Taken By, (c) = Cosigned By    Initials Name Provider Type    ANNMARIE Tenorio, PT Physical Therapist                Therapy Education       10/19/17 1300          Therapy Education    Given HEP;Posture/body mechanics  -      Program Reinforced  -ANNMARIE      How Provided Verbal;Demonstration  -      Provided to Patient  -AJ      Level of Understanding Verbalized;Demonstrated  -        User Key  (r) = Recorded By, (t) = Taken By, (c) = Cosigned By    Initials Name Provider Type    ANNMARIE Tenorio, PT Physical Therapist                Outcome Measures       10/17/17 1600          Neck Disability Index    Section 1 - Pain Intensity 4  -ANNMARIE      Section 2 - Personal Care 2  -AJ      Section 3 - Lifting 2  -AJ      Section 4 - Work 2  -AJ      Section 5 - Headaches 0   -AJ      Section 6 - Concentration 0  -AJ      Section 7 - Sleeping 3  -AJ      Section 8 - Driving 2  -AJ      Section 9 - Reading 1  -AJ      Section 10 - Recreation 2  -AJ      Neck Disability Index Score 18  -AJ      Neck Disability Index Comments 36%  -      Functional Assessment    Outcome Measure Options Neck Disability Index (NDI)  -        User Key  (r) = Recorded By, (t) = Taken By, (c) = Cosigned By    Initials Name Provider Type    AJ Marjan Tenorio, PT Physical Therapist            Time Calculation:   Start Time: 1258  PT Non-Billable Time (min): 15 min    Therapy Charges for Today     Code Description Service Date Service Provider Modifiers Qty    10913002486  PT THER PROC EA 15 MIN 10/19/2017 Marjan Tenorio, PT GP 3    14340871798 HC PT THER SUPP EA 15 MIN 10/19/2017 Marjan Tenorio, PT GP 1                    Marjan Tenorio, PT  10/19/2017

## 2017-10-24 ENCOUNTER — TELEPHONE (OUTPATIENT)
Dept: PHYSICAL THERAPY | Facility: HOSPITAL | Age: 57
End: 2017-10-24

## 2017-10-26 ENCOUNTER — TELEPHONE (OUTPATIENT)
Dept: PHYSICAL THERAPY | Facility: HOSPITAL | Age: 57
End: 2017-10-26

## 2017-11-08 RX ORDER — ATORVASTATIN CALCIUM 20 MG/1
20 TABLET, FILM COATED ORAL DAILY
Qty: 30 TABLET | Refills: 3 | Status: SHIPPED | OUTPATIENT
Start: 2017-11-08 | End: 2018-08-17 | Stop reason: SDUPTHER

## 2017-11-17 ENCOUNTER — OFFICE VISIT (OUTPATIENT)
Dept: FAMILY MEDICINE CLINIC | Facility: CLINIC | Age: 57
End: 2017-11-17

## 2017-11-17 VITALS
BODY MASS INDEX: 34.87 KG/M2 | OXYGEN SATURATION: 97 % | WEIGHT: 217 LBS | HEIGHT: 66 IN | SYSTOLIC BLOOD PRESSURE: 163 MMHG | RESPIRATION RATE: 16 BRPM | DIASTOLIC BLOOD PRESSURE: 93 MMHG | TEMPERATURE: 97.6 F | HEART RATE: 97 BPM

## 2017-11-17 DIAGNOSIS — M54.2 NECK PAIN: Primary | ICD-10-CM

## 2017-11-17 DIAGNOSIS — M79.601 RIGHT ARM PAIN: ICD-10-CM

## 2017-11-17 PROCEDURE — 99213 OFFICE O/P EST LOW 20 MIN: CPT | Performed by: NURSE PRACTITIONER

## 2017-11-17 NOTE — PROGRESS NOTES
Subjective   Goldie Zaria Kraus is a 57 y.o. female.     HPI Comments: Here today for hoa on her neck pain.  She has been to see physical therapy about 2 times and says it made it worse.  X rays of her neck showed degenerative disease.  She has pain that runs down her arms.  She cont to take the muscle relaxer and the nsaid.    Neck Pain    This is a chronic problem. The current episode started more than 1 month ago. The problem occurs constantly. The problem has been unchanged. The pain is associated with nothing. The pain is present in the anterior neck. The quality of the pain is described as aching. The pain is at a severity of 7/10. The pain is moderate. The symptoms are aggravated by position and twisting. The pain is same all the time. Stiffness is present in the morning. Pertinent negatives include no chest pain, fever, headaches, leg pain, numbness, pain with swallowing, paresis, photophobia, syncope, tingling, trouble swallowing, visual change, weakness or weight loss. She has tried NSAIDs and muscle relaxants for the symptoms. The treatment provided moderate relief.        The following portions of the patient's history were reviewed and updated as appropriate: allergies, current medications, past family history, past medical history, past social history, past surgical history and problem list.    Review of Systems   Constitutional: Negative.  Negative for fever and weight loss.   HENT: Negative.  Negative for trouble swallowing.    Eyes: Negative for photophobia.   Cardiovascular: Negative.  Negative for chest pain and syncope.   Musculoskeletal: Positive for neck pain.   Skin: Negative.    Neurological: Negative.  Negative for tingling, weakness, numbness and headaches.   Psychiatric/Behavioral: Negative.        Objective   Physical Exam   Constitutional: She is oriented to person, place, and time. She appears well-developed and well-nourished. No distress.   HENT:   Head: Normocephalic.   Eyes: EOM  are normal. Pupils are equal, round, and reactive to light.   Neck: Normal range of motion. Neck supple. No thyromegaly present.   Cardiovascular: Normal rate, regular rhythm and normal heart sounds.  Exam reveals no friction rub.    No murmur heard.  Pulmonary/Chest: Effort normal and breath sounds normal. No respiratory distress. She has no wheezes. She has no rales.   Abdominal: Soft.   Musculoskeletal:        Cervical back: She exhibits decreased range of motion, pain and spasm.   Neurological: She is alert and oriented to person, place, and time.   Skin: Skin is warm and dry.   Psychiatric: She has a normal mood and affect. Thought content normal.   Nursing note and vitals reviewed.      Assessment/Plan   Goldie was seen today for neck pain.    Diagnoses and all orders for this visit:    Neck pain  -     MRI Cervical Spine Without Contrast; Future    Right arm pain  -     MRI Cervical Spine Without Contrast; Future      Will try to get mri scheduled.  Then decide on further course of treatment.  She should cont with the nsaid and muscle relaxer.    She cont to do her home exercises as prescribed by the pt.

## 2017-11-17 NOTE — PATIENT INSTRUCTIONS
Calorie Counting for Weight Loss  Calories are energy you get from the things you eat and drink. Your body uses this energy to keep you going throughout the day. The number of calories you eat affects your weight. When you eat more calories than your body needs, your body stores the extra calories as fat. When you eat fewer calories than your body needs, your body burns fat to get the energy it needs.  Calorie counting means keeping track of how many calories you eat and drink each day. If you make sure to eat fewer calories than your body needs, you should lose weight. In order for calorie counting to work, you will need to eat the number of calories that are right for you in a day to lose a healthy amount of weight per week. A healthy amount of weight to lose per week is usually 1-2 lb (0.5-0.9 kg). A dietitian can determine how many calories you need in a day and give you suggestions on how to reach your calorie goal.   WHAT IS MY MY PLAN?  My goal is to have __________ calories per day.   If I have this many calories per day, I should lose around __________ pounds per week.  WHAT DO I NEED TO KNOW ABOUT CALORIE COUNTING?  In order to meet your daily calorie goal, you will need to:  · Find out how many calories are in each food you would like to eat. Try to do this before you eat.  · Decide how much of the food you can eat.  · Write down what you ate and how many calories it had. Doing this is called keeping a food log.  WHERE DO I FIND CALORIE INFORMATION?  The number of calories in a food can be found on a Nutrition Facts label. Note that all the information on a label is based on a specific serving of the food. If a food does not have a Nutrition Facts label, try to look up the calories online or ask your dietitian for help.  HOW DO I DECIDE HOW MUCH TO EAT?  To decide how much of the food you can eat, you will need to consider both the number of calories in one serving and the size of one serving. This  information can be found on the Nutrition Facts label. If a food does not have a Nutrition Facts label, look up the information online or ask your dietitian for help.  Remember that calories are listed per serving. If you choose to have more than one serving of a food, you will have to multiply the calories per serving by the amount of servings you plan to eat. For example, the label on a package of bread might say that a serving size is 1 slice and that there are 90 calories in a serving. If you eat 1 slice, you will have eaten 90 calories. If you eat 2 slices, you will have eaten 180 calories.  HOW DO I KEEP A FOOD LOG?  After each meal, record the following information in your food log:  · What you ate.  · How much of it you ate.  · How many calories it had.  · Then, add up your calories.  Keep your food log near you, such as in a small notebook in your pocket. Another option is to use a mobile anthony or website. Some programs will calculate calories for you and show you how many calories you have left each time you add an item to the log.  WHAT ARE SOME CALORIE COUNTING TIPS?  · Use your calories on foods and drinks that will fill you up and not leave you hungry. Some examples of this include foods like nuts and nut butters, vegetables, lean proteins, and high-fiber foods (more than 5 g fiber per serving).  · Eat nutritious foods and avoid empty calories. Empty calories are calories you get from foods or beverages that do not have many nutrients, such as candy and soda. It is better to have a nutritious high-calorie food (such as an avocado) than a food with few nutrients (such as a bag of chips).  · Know how many calories are in the foods you eat most often. This way, you do not have to look up how many calories they have each time you eat them.  · Look out for foods that may seem like low-calorie foods but are really high-calorie foods, such as baked goods, soda, and fat-free candy.  · Pay attention to calories  in drinks. Drinks such as sodas, specialty coffee drinks, alcohol, and juices have a lot of calories yet do not fill you up. Choose low-calorie drinks like water and diet drinks.  · Focus your calorie counting efforts on higher calorie items. Logging the calories in a garden salad that contains only vegetables is less important than calculating the calories in a milk shake.  · Find a way of tracking calories that works for you. Get creative. Most people who are successful find ways to keep track of how much they eat in a day, even if they do not count every calorie.  WHAT ARE SOME PORTION CONTROL TIPS?  · Know how many calories are in a serving. This will help you know how many servings of a certain food you can have.  · Use a measuring cup to measure serving sizes. This is helpful when you start out. With time, you will be able to estimate serving sizes for some foods.  · Take some time to put servings of different foods on your favorite plates, bowls, and cups so you know what a serving looks like.  · Try not to eat straight from a bag or box. Doing this can lead to overeating. Put the amount you would like to eat in a cup or on a plate to make sure you are eating the right portion.  · Use smaller plates, glasses, and bowls to prevent overeating. This is a quick and easy way to practice portion control. If your plate is smaller, less food can fit on it.  · Try not to multitask while eating, such as watching TV or using your computer. If it is time to eat, sit down at a table and enjoy your food. Doing this will help you to start recognizing when you are full. It will also make you more aware of what and how much you are eating.  HOW CAN I CALORIE COUNT WHEN EATING OUT?  · Ask for smaller portion sizes or child-sized portions.  · Consider sharing an entree and sides instead of getting your own entree.  · If you get your own entree, eat only half. Ask for a box at the beginning of your meal and put the rest of your  "entree in it so you are not tempted to eat it.  · Look for the calories on the menu. If calories are listed, choose the lower calorie options.  · Choose dishes that include vegetables, fruits, whole grains, low-fat dairy products, and lean protein. Focusing on smart food choices from each of the 5 food groups can help you stay on track at restaurants.  · Choose items that are boiled, broiled, grilled, or steamed.  · Choose water, milk, unsweetened iced tea, or other drinks without added sugars. If you want an alcoholic beverage, choose a lower calorie option. For example, a regular kyle can have up to 700 calories and a glass of wine has around 150.  · Stay away from items that are buttered, battered, fried, or served with cream sauce. Items labeled \"crispy\" are usually fried, unless stated otherwise.  · Ask for dressings, sauces, and syrups on the side. These are usually very high in calories, so do not eat much of them.  · Watch out for salads. Many people think salads are a healthy option, but this is often not the case. Many salads come with messina, fried chicken, lots of cheese, fried chips, and dressing. All of these items have a lot of calories. If you want a salad, choose a garden salad and ask for grilled meats or steak. Ask for the dressing on the side, or ask for olive oil and vinegar or lemon to use as dressing.  · Estimate how many servings of a food you are given. For example, a serving of cooked rice is ½ cup or about the size of half a tennis ball or one cupcake wrapper. Knowing serving sizes will help you be aware of how much food you are eating at restaurants. The list below tells you how big or small some common portion sizes are based on everyday objects.    1 oz--4 stacked dice.    3 oz--1 deck of cards.    1 tsp--1 dice.    1 Tbsp--½ a Ping-Pong ball.    2 Tbsp--1 Ping-Pong ball.    ½ cup--1 tennis ball or 1 cupcake wrapper.    1 cup--1 baseball.     This information is not intended to " replace advice given to you by your health care provider. Make sure you discuss any questions you have with your health care provider.     Document Released: 12/18/2006 Document Revised: 01/08/2016 Document Reviewed: 10/23/2014  [employer] Interactive Patient Education ©2017 [employer]

## 2017-12-07 ENCOUNTER — OFFICE VISIT (OUTPATIENT)
Dept: FAMILY MEDICINE CLINIC | Facility: CLINIC | Age: 57
End: 2017-12-07

## 2017-12-07 VITALS
SYSTOLIC BLOOD PRESSURE: 133 MMHG | HEART RATE: 74 BPM | HEIGHT: 66 IN | OXYGEN SATURATION: 98 % | TEMPERATURE: 97.6 F | DIASTOLIC BLOOD PRESSURE: 71 MMHG | WEIGHT: 214 LBS | BODY MASS INDEX: 34.39 KG/M2 | RESPIRATION RATE: 20 BRPM

## 2017-12-07 DIAGNOSIS — M54.2 NECK PAIN: Primary | ICD-10-CM

## 2017-12-07 PROCEDURE — 96372 THER/PROPH/DIAG INJ SC/IM: CPT | Performed by: NURSE PRACTITIONER

## 2017-12-07 PROCEDURE — 99213 OFFICE O/P EST LOW 20 MIN: CPT | Performed by: NURSE PRACTITIONER

## 2017-12-07 RX ORDER — BETAMETHASONE SODIUM PHOSPHATE AND BETAMETHASONE ACETATE 3; 3 MG/ML; MG/ML
12 INJECTION, SUSPENSION INTRA-ARTICULAR; INTRALESIONAL; INTRAMUSCULAR; SOFT TISSUE ONCE
Status: COMPLETED | OUTPATIENT
Start: 2017-12-07 | End: 2017-12-07

## 2017-12-07 RX ORDER — CYCLOBENZAPRINE HCL 10 MG
10 TABLET ORAL 3 TIMES DAILY PRN
Qty: 90 TABLET | Refills: 3 | Status: SHIPPED | OUTPATIENT
Start: 2017-12-07 | End: 2018-04-09 | Stop reason: SDUPTHER

## 2017-12-07 RX ADMIN — BETAMETHASONE SODIUM PHOSPHATE AND BETAMETHASONE ACETATE 12 MG: 3; 3 INJECTION, SUSPENSION INTRA-ARTICULAR; INTRALESIONAL; INTRAMUSCULAR; SOFT TISSUE at 14:07

## 2017-12-07 NOTE — PROGRESS NOTES
Subjective   Goldie Zaria Kraus is a 57 y.o. female.     HPI Comments: Here today for hoa she cont to have chronic neck pain.  Now has pain running down into both arms.  She cont to take the voltaren and the flexeril.    Neck Pain    This is a chronic problem. The current episode started more than 1 year ago. The problem occurs constantly. The problem has been gradually worsening. The pain is associated with nothing. The pain is present in the anterior neck. The quality of the pain is described as aching and burning. The pain is at a severity of 8/10. The pain is severe. The symptoms are aggravated by twisting, stress, position and bending. The pain is same all the time. Stiffness is present in the morning. Pertinent negatives include no chest pain, fever, headaches, leg pain, numbness, pain with swallowing, paresis, photophobia, syncope, tingling, trouble swallowing, visual change, weakness or weight loss. She has tried muscle relaxants for the symptoms. The treatment provided no relief.        The following portions of the patient's history were reviewed and updated as appropriate: allergies, current medications, past family history, past medical history, past social history, past surgical history and problem list.    Review of Systems   Constitutional: Negative.  Negative for fever and weight loss.   HENT: Negative.  Negative for trouble swallowing.    Eyes: Negative for photophobia.   Respiratory: Negative.    Cardiovascular: Negative.  Negative for chest pain and syncope.   Musculoskeletal: Positive for neck pain.   Skin: Negative.    Neurological: Negative.  Negative for tingling, weakness, numbness and headaches.   Psychiatric/Behavioral: Negative.        Objective   Physical Exam   Constitutional: She is oriented to person, place, and time. She appears well-nourished. No distress.   HENT:   Head: Normocephalic.   Eyes: Pupils are equal, round, and reactive to light.   Neck: Normal range of motion. Neck  supple. No thyromegaly present.   Cardiovascular: Normal rate, regular rhythm and normal heart sounds.  Exam reveals no friction rub.    No murmur heard.  Pulmonary/Chest: Effort normal and breath sounds normal. No respiratory distress. She has no wheezes. She has no rales.   Abdominal: Soft.   Musculoskeletal:        Cervical back: She exhibits decreased range of motion, pain and spasm.   Have reviewed her xray of her neck.  Has multilevel degenerative changes.   Neurological: She is alert and oriented to person, place, and time.   Skin: Skin is warm and dry.   Psychiatric: She has a normal mood and affect. Thought content normal.   Nursing note and vitals reviewed.      Assessment/Plan   Goldie was seen today for cough, nasal congestion and neck pain.    Diagnoses and all orders for this visit:    Neck pain  -     cyclobenzaprine (FLEXERIL) 10 MG tablet; Take 1 tablet by mouth 3 (Three) Times a Day As Needed for Muscle Spasms.  -     betamethasone acetate-betamethasone sodium phosphate (CELESTONE SOLUSPAN) injection 12 mg; Inject 2 mL into the shoulder, thigh, or buttocks 1 (One) Time.  -     MRI cervical spine wo contrast; Future

## 2017-12-18 ENCOUNTER — OFFICE VISIT (OUTPATIENT)
Dept: FAMILY MEDICINE CLINIC | Facility: CLINIC | Age: 57
End: 2017-12-18

## 2017-12-18 VITALS
WEIGHT: 217 LBS | HEIGHT: 66 IN | HEART RATE: 84 BPM | BODY MASS INDEX: 34.87 KG/M2 | TEMPERATURE: 98.1 F | RESPIRATION RATE: 20 BRPM | SYSTOLIC BLOOD PRESSURE: 147 MMHG | DIASTOLIC BLOOD PRESSURE: 84 MMHG | OXYGEN SATURATION: 97 %

## 2017-12-18 DIAGNOSIS — M54.2 NECK PAIN: Primary | ICD-10-CM

## 2017-12-18 DIAGNOSIS — M79.601 RIGHT ARM PAIN: ICD-10-CM

## 2017-12-18 PROCEDURE — 99214 OFFICE O/P EST MOD 30 MIN: CPT | Performed by: NURSE PRACTITIONER

## 2017-12-18 NOTE — PROGRESS NOTES
Subjective   Goldie Zaria Kraus is a 57 y.o. female.     HPI Comments: Here today for hoa on her neck pain.  We had tried to order a mri of her cervical spine, but her insurance would not approve it.  Also she did not finish the physical therapy because she says it made the pain worse.  xrays showed degenerative changes at multi levels.    Neck Pain    This is a chronic problem. The current episode started more than 1 month ago. The problem occurs constantly. The problem has been waxing and waning. The pain is associated with nothing. The pain is present in the anterior neck. The quality of the pain is described as aching. The pain is at a severity of 4/10. The pain is moderate. The symptoms are aggravated by position. The pain is same all the time. Stiffness is present in the morning. Pertinent negatives include no chest pain, fever, headaches, leg pain, numbness, pain with swallowing, paresis, photophobia, syncope, tingling, trouble swallowing, visual change, weakness or weight loss. She has tried muscle relaxants and NSAIDs for the symptoms. The treatment provided mild relief.        The following portions of the patient's history were reviewed and updated as appropriate: allergies, current medications, past family history, past medical history, past social history, past surgical history and problem list.    Review of Systems   Constitutional: Negative.  Negative for fever and weight loss.   HENT: Negative.  Negative for trouble swallowing.    Eyes: Negative for photophobia.   Respiratory: Negative.    Cardiovascular: Negative.  Negative for chest pain and syncope.   Musculoskeletal: Positive for neck pain.   Skin: Negative.    Neurological: Negative.  Negative for tingling, weakness, numbness and headaches.   Psychiatric/Behavioral: Negative.        Objective   Physical Exam   Constitutional: She is oriented to person, place, and time. She appears well-developed and well-nourished. No distress.   HENT:    Head: Normocephalic.   Eyes: Pupils are equal, round, and reactive to light.   Neck: Normal range of motion. Neck supple. No thyromegaly present.   Cardiovascular: Normal rate, regular rhythm and normal heart sounds.  Exam reveals no friction rub.    No murmur heard.  Pulmonary/Chest: Effort normal and breath sounds normal. No respiratory distress. She has no wheezes. She has no rales.   Abdominal: Soft.   Musculoskeletal:        Cervical back: She exhibits decreased range of motion, pain and spasm.   Neurological: She is alert and oriented to person, place, and time.   Skin: Skin is warm and dry.   Psychiatric: She has a normal mood and affect. Thought content normal.   Nursing note and vitals reviewed.      Assessment/Plan   Goldie was seen today for neck pain.    Diagnoses and all orders for this visit:    Neck pain  -     Ambulatory Referral to Hospital Pain Management Department    Right arm pain    cont with the nsaid and the muscle relaxer.

## 2018-01-24 ENCOUNTER — DOCUMENTATION (OUTPATIENT)
Dept: PHYSICAL THERAPY | Facility: HOSPITAL | Age: 58
End: 2018-01-24

## 2018-01-24 DIAGNOSIS — M54.2 NECK PAIN: ICD-10-CM

## 2018-01-24 DIAGNOSIS — M79.601 RIGHT ARM PAIN: Primary | ICD-10-CM

## 2018-01-26 DIAGNOSIS — K21.9 GASTROESOPHAGEAL REFLUX DISEASE, ESOPHAGITIS PRESENCE NOT SPECIFIED: ICD-10-CM

## 2018-01-26 RX ORDER — OMEPRAZOLE 40 MG/1
CAPSULE, DELAYED RELEASE ORAL
Qty: 30 CAPSULE | Refills: 3 | Status: SHIPPED | OUTPATIENT
Start: 2018-01-26 | End: 2018-05-28 | Stop reason: SDUPTHER

## 2018-01-29 ENCOUNTER — TELEPHONE (OUTPATIENT)
Dept: FAMILY MEDICINE CLINIC | Facility: CLINIC | Age: 58
End: 2018-01-29

## 2018-02-08 RX ORDER — TRAMADOL HYDROCHLORIDE 50 MG/1
50 TABLET ORAL EVERY 6 HOURS PRN
Qty: 30 TABLET | Refills: 0 | OUTPATIENT
Start: 2018-02-08 | End: 2019-02-25 | Stop reason: SDUPTHER

## 2018-03-28 DIAGNOSIS — I10 ESSENTIAL HYPERTENSION: ICD-10-CM

## 2018-03-29 RX ORDER — LOSARTAN POTASSIUM 50 MG/1
TABLET ORAL
Qty: 30 TABLET | Refills: 5 | Status: SHIPPED | OUTPATIENT
Start: 2018-03-29 | End: 2018-08-17 | Stop reason: SDUPTHER

## 2018-04-09 DIAGNOSIS — M54.2 NECK PAIN: ICD-10-CM

## 2018-04-09 RX ORDER — CYCLOBENZAPRINE HCL 10 MG
TABLET ORAL
Qty: 90 TABLET | Refills: 3 | Status: SHIPPED | OUTPATIENT
Start: 2018-04-09 | End: 2018-09-27

## 2018-05-28 DIAGNOSIS — K21.9 GASTROESOPHAGEAL REFLUX DISEASE, ESOPHAGITIS PRESENCE NOT SPECIFIED: ICD-10-CM

## 2018-06-04 RX ORDER — OMEPRAZOLE 40 MG/1
CAPSULE, DELAYED RELEASE ORAL
Qty: 30 CAPSULE | Refills: 3 | Status: SHIPPED | OUTPATIENT
Start: 2018-06-04 | End: 2018-08-17 | Stop reason: SDUPTHER

## 2018-08-17 ENCOUNTER — OFFICE VISIT (OUTPATIENT)
Dept: FAMILY MEDICINE CLINIC | Facility: CLINIC | Age: 58
End: 2018-08-17

## 2018-08-17 VITALS
BODY MASS INDEX: 33.43 KG/M2 | HEART RATE: 92 BPM | SYSTOLIC BLOOD PRESSURE: 117 MMHG | OXYGEN SATURATION: 99 % | DIASTOLIC BLOOD PRESSURE: 65 MMHG | TEMPERATURE: 97.7 F | WEIGHT: 208 LBS | RESPIRATION RATE: 18 BRPM | HEIGHT: 66 IN

## 2018-08-17 DIAGNOSIS — I10 ESSENTIAL HYPERTENSION: Primary | ICD-10-CM

## 2018-08-17 DIAGNOSIS — K21.9 GASTROESOPHAGEAL REFLUX DISEASE, ESOPHAGITIS PRESENCE NOT SPECIFIED: ICD-10-CM

## 2018-08-17 DIAGNOSIS — E78.2 MIXED HYPERLIPIDEMIA: ICD-10-CM

## 2018-08-17 DIAGNOSIS — M54.2 NECK PAIN: ICD-10-CM

## 2018-08-17 PROCEDURE — 99214 OFFICE O/P EST MOD 30 MIN: CPT | Performed by: NURSE PRACTITIONER

## 2018-08-17 RX ORDER — DICLOFENAC SODIUM 75 MG/1
75 TABLET, DELAYED RELEASE ORAL 2 TIMES DAILY
Qty: 60 TABLET | Refills: 3 | Status: SHIPPED | OUTPATIENT
Start: 2018-08-17 | End: 2018-12-22 | Stop reason: SDUPTHER

## 2018-08-17 RX ORDER — ATORVASTATIN CALCIUM 20 MG/1
20 TABLET, FILM COATED ORAL DAILY
Qty: 30 TABLET | Refills: 3 | Status: SHIPPED | OUTPATIENT
Start: 2018-08-17 | End: 2018-12-22 | Stop reason: SDUPTHER

## 2018-08-17 RX ORDER — LOSARTAN POTASSIUM 50 MG/1
50 TABLET ORAL DAILY
Qty: 30 TABLET | Refills: 5 | Status: SHIPPED | OUTPATIENT
Start: 2018-08-17 | End: 2019-02-16 | Stop reason: SDUPTHER

## 2018-08-17 RX ORDER — OMEPRAZOLE 40 MG/1
40 CAPSULE, DELAYED RELEASE ORAL DAILY
Qty: 30 CAPSULE | Refills: 5 | Status: SHIPPED | OUTPATIENT
Start: 2018-08-17 | End: 2019-02-16 | Stop reason: SDUPTHER

## 2018-09-27 ENCOUNTER — OFFICE VISIT (OUTPATIENT)
Dept: FAMILY MEDICINE CLINIC | Facility: CLINIC | Age: 58
End: 2018-09-27

## 2018-09-27 VITALS
TEMPERATURE: 98.5 F | HEIGHT: 66 IN | WEIGHT: 213 LBS | HEART RATE: 94 BPM | SYSTOLIC BLOOD PRESSURE: 133 MMHG | BODY MASS INDEX: 34.23 KG/M2 | RESPIRATION RATE: 18 BRPM | DIASTOLIC BLOOD PRESSURE: 87 MMHG | OXYGEN SATURATION: 98 %

## 2018-09-27 DIAGNOSIS — M54.5 LOW BACK PAIN, UNSPECIFIED BACK PAIN LATERALITY, UNSPECIFIED CHRONICITY, WITH SCIATICA PRESENCE UNSPECIFIED: Primary | ICD-10-CM

## 2018-09-27 PROCEDURE — 99213 OFFICE O/P EST LOW 20 MIN: CPT | Performed by: NURSE PRACTITIONER

## 2018-09-27 RX ORDER — METHYLPREDNISOLONE 4 MG/1
TABLET ORAL
Qty: 21 EACH | Refills: 0 | Status: SHIPPED | OUTPATIENT
Start: 2018-09-27 | End: 2020-06-01

## 2018-09-27 NOTE — PROGRESS NOTES
Subjective   Goldie Zaria Kraus is a 58 y.o. female.     Went to the er last week with exacerbation of lumbar back pain.  She was told she had arthritis in her spine.  She reports that she did nothing to cause the pain.  She does have know arthritis in her neck and she takes a nsaid for it.  She says the muscle relaxer is not effective.  She has been to physical therapy, but says that after one session she was too sore to work the next day.  She refused to go back to therapy.      Arthritis   Presents for follow-up visit. She complains of pain and stiffness. The symptoms have been improving. Affected locations include the neck (lumbar spine). Her pain is at a severity of 5/10. Associated symptoms include pain at night and pain while resting. Pertinent negatives include no diarrhea, dry eyes, dry mouth, dysuria, fatigue, fever, rash, Raynaud's syndrome, uveitis or weight loss. Compliance with total regimen is 51-75%. Compliance problems include medication side effects.  Compliance with medications is 51-75%.        The following portions of the patient's history were reviewed and updated as appropriate: allergies, current medications, past family history, past medical history, past social history, past surgical history and problem list.    Review of Systems   Constitutional: Negative.  Negative for fatigue, fever and unexpected weight loss.   HENT: Negative.    Respiratory: Negative.    Cardiovascular: Negative.    Gastrointestinal: Negative for diarrhea.   Genitourinary: Negative for dysuria.   Musculoskeletal: Positive for arthralgias, arthritis, back pain, neck pain and stiffness.   Skin: Negative.  Negative for rash.   Neurological: Negative.    Psychiatric/Behavioral: Negative.        Objective   Physical Exam   Constitutional: She is oriented to person, place, and time. She appears well-developed and well-nourished. No distress.   HENT:   Head: Normocephalic.   Eyes: Pupils are equal, round, and reactive to  light.   Neck: Normal range of motion. Neck supple. No thyromegaly present.   Cardiovascular: Normal rate, regular rhythm and normal heart sounds.  Exam reveals no friction rub.    No murmur heard.  Pulmonary/Chest: Effort normal and breath sounds normal. No respiratory distress. She has no wheezes. She has no rales.   Abdominal: Soft.   Musculoskeletal: Normal range of motion.        Lumbar back: She exhibits tenderness, pain and spasm. She exhibits normal range of motion.   X ray report from [facility] is reviewed and shows mild degenerative changes.   Neurological: She is alert and oriented to person, place, and time.   Skin: Skin is warm and dry.   Psychiatric: She has a normal mood and affect. Thought content normal.   Nursing note and vitals reviewed.        Assessment/Plan   Goldie was seen today for arthritis.    Diagnoses and all orders for this visit:    Low back pain, unspecified back pain laterality, unspecified chronicity, with sciatica presence unspecified  -     MethylPREDNISolone (MEDROL, LAZARO,) 4 MG tablet; Take as directed on package instructions.    cont with her nsaid.

## 2018-10-11 ENCOUNTER — TELEPHONE (OUTPATIENT)
Dept: FAMILY MEDICINE CLINIC | Facility: CLINIC | Age: 58
End: 2018-10-11

## 2018-10-11 NOTE — TELEPHONE ENCOUNTER
Patient called and was in here two weeks ago to see you and you gave her a steroid pack.  She took it for 6 days did fine and felt good till yesterday.  Says she is in worse pain than she was when she came in here 2 weeks ago wants to know if you can give here something for the pain or does she need to come back in to be seen.

## 2018-10-15 RX ORDER — TRAMADOL HYDROCHLORIDE 50 MG/1
50 TABLET ORAL 2 TIMES DAILY PRN
Qty: 60 TABLET | Refills: 1 | OUTPATIENT
Start: 2018-10-15 | End: 2019-01-17

## 2018-11-15 ENCOUNTER — OFFICE VISIT (OUTPATIENT)
Dept: FAMILY MEDICINE CLINIC | Facility: CLINIC | Age: 58
End: 2018-11-15

## 2018-11-15 VITALS
OXYGEN SATURATION: 98 % | BODY MASS INDEX: 33.75 KG/M2 | WEIGHT: 210 LBS | TEMPERATURE: 97.1 F | HEART RATE: 93 BPM | HEIGHT: 66 IN | DIASTOLIC BLOOD PRESSURE: 74 MMHG | RESPIRATION RATE: 18 BRPM | SYSTOLIC BLOOD PRESSURE: 129 MMHG

## 2018-11-15 DIAGNOSIS — M54.2 NECK PAIN: Primary | ICD-10-CM

## 2018-11-15 PROCEDURE — 99214 OFFICE O/P EST MOD 30 MIN: CPT | Performed by: NURSE PRACTITIONER

## 2018-11-15 PROCEDURE — 96372 THER/PROPH/DIAG INJ SC/IM: CPT | Performed by: NURSE PRACTITIONER

## 2018-11-15 RX ORDER — BETAMETHASONE SODIUM PHOSPHATE AND BETAMETHASONE ACETATE 3; 3 MG/ML; MG/ML
12 INJECTION, SUSPENSION INTRA-ARTICULAR; INTRALESIONAL; INTRAMUSCULAR; SOFT TISSUE EVERY 24 HOURS
Status: SHIPPED | OUTPATIENT
Start: 2018-11-15 | End: 2018-11-17

## 2018-11-15 RX ADMIN — BETAMETHASONE SODIUM PHOSPHATE AND BETAMETHASONE ACETATE 12 MG: 3; 3 INJECTION, SUSPENSION INTRA-ARTICULAR; INTRALESIONAL; INTRAMUSCULAR; SOFT TISSUE at 15:47

## 2018-11-16 NOTE — PROGRESS NOTES
Subjective   Goldie Kraus is a 58 y.o. female.     Here today with cervical neck pain that runs down into her right shoulder.  This is a chronic condition.  She report pain that is preventing her from sleeping at night.  X rays were done last year and showed multilevel degenerative changes.  She takes a nsaid and muscle relaxer but they have had limited impact on her pain.  She says she has had pt in the past and it did not help.  Has had no recent pt.  Have tried to order a mri, but her insurance denied it, probably due to lack of pt.      Neck Pain    This is a chronic problem. The current episode started more than 1 year ago. The problem occurs constantly. The pain is associated with nothing. The pain is present in the anterior neck. The quality of the pain is described as aching and cramping. The pain is at a severity of 5/10. The pain is moderate. The symptoms are aggravated by position. The pain is worse during the night. Stiffness is present at night. Pertinent negatives include no chest pain, fever, headaches, leg pain, numbness, pain with swallowing, paresis, photophobia, syncope, tingling, trouble swallowing, visual change, weakness or weight loss. She has tried NSAIDs and muscle relaxants for the symptoms. The treatment provided mild relief.        The following portions of the patient's history were reviewed and updated as appropriate: allergies, current medications, past family history, past medical history, past social history, past surgical history and problem list.    Review of Systems   Constitutional: Negative.  Negative for fever and unexpected weight loss.   HENT: Negative.  Negative for trouble swallowing.    Eyes: Negative for photophobia.   Respiratory: Negative.    Cardiovascular: Negative.  Negative for chest pain and syncope.   Musculoskeletal: Positive for neck pain.   Skin: Negative.    Neurological: Negative.  Negative for tingling, weakness and numbness.    Psychiatric/Behavioral: Negative.        Objective   Physical Exam   Constitutional: She is oriented to person, place, and time. She appears well-developed and well-nourished. No distress.   HENT:   Head: Normocephalic.   Eyes: Pupils are equal, round, and reactive to light.   Neck: Normal range of motion. Neck supple. No thyromegaly present.   Cardiovascular: Normal rate, regular rhythm and normal heart sounds. Exam reveals no friction rub.   No murmur heard.  Pulmonary/Chest: Effort normal and breath sounds normal. No stridor. No respiratory distress. She has no wheezes.   Abdominal: Soft.   Musculoskeletal: Normal range of motion.        Cervical back: She exhibits tenderness, pain and spasm. She exhibits normal range of motion.   Neurological: She is alert and oriented to person, place, and time.   Skin: Skin is warm and dry.   Psychiatric: She has a normal mood and affect. Thought content normal.   Nursing note and vitals reviewed.        Assessment/Plan   Goldie was seen today for shoulder pain and neck pain.    Diagnoses and all orders for this visit:    Neck pain  -     Ambulatory Referral to Physical Therapy Evaluate and treat  -     betamethasone acetate-betamethasone sodium phosphate (CELESTONE SOLUSPAN) injection 12 mg; Inject 2 mL into the appropriate muscle as directed by prescriber Daily.    discussed with her at some length that she needs to have physical therapy before insurance will ever consider paying for a mri.  She has agreed.  Will cont with her other full time meds.

## 2018-12-17 ENCOUNTER — OFFICE VISIT (OUTPATIENT)
Dept: FAMILY MEDICINE CLINIC | Facility: CLINIC | Age: 58
End: 2018-12-17

## 2018-12-17 VITALS
RESPIRATION RATE: 20 BRPM | BODY MASS INDEX: 35.84 KG/M2 | DIASTOLIC BLOOD PRESSURE: 86 MMHG | TEMPERATURE: 98.1 F | HEIGHT: 66 IN | HEART RATE: 95 BPM | OXYGEN SATURATION: 98 % | WEIGHT: 223 LBS | SYSTOLIC BLOOD PRESSURE: 141 MMHG

## 2018-12-17 DIAGNOSIS — M54.2 NECK PAIN: Primary | ICD-10-CM

## 2018-12-17 PROCEDURE — 99213 OFFICE O/P EST LOW 20 MIN: CPT | Performed by: NURSE PRACTITIONER

## 2018-12-17 RX ORDER — GABAPENTIN 100 MG/1
100 CAPSULE ORAL NIGHTLY
Qty: 30 CAPSULE | Refills: 0 | Status: SHIPPED | OUTPATIENT
Start: 2018-12-17 | End: 2019-04-12 | Stop reason: SDUPTHER

## 2018-12-18 NOTE — PROGRESS NOTES
Subjective   Goldiekeira Kraus is a 58 y.o. female.     Here today for hoa on her cervical neck pain.  She reports she did not go to physical therapy.  She cont to have pain that she rates at a 9.  She cont to take the nsaid and muscle relaxer and she reports they do not help.  She says she has pain running down both arms now.      Neck Pain    This is a chronic problem. The current episode started more than 1 year ago. The problem occurs constantly. The problem has been waxing and waning. The pain is associated with an unknown factor. The pain is present in the occipital region. The quality of the pain is described as aching. The pain is at a severity of 9/10. The pain is severe. The symptoms are aggravated by position, stress, twisting and bending. The pain is same all the time. Stiffness is present all day. Pertinent negatives include no chest pain, fever, headaches, leg pain, numbness, pain with swallowing, paresis, photophobia, syncope, tingling, trouble swallowing, visual change, weakness or weight loss. She has tried NSAIDs and muscle relaxants for the symptoms. The treatment provided mild relief.        The following portions of the patient's history were reviewed and updated as appropriate: allergies, current medications, past family history, past medical history, past social history, past surgical history and problem list.    Review of Systems   Constitutional: Negative.  Negative for fever and unexpected weight loss.   HENT: Negative.  Negative for trouble swallowing.    Eyes: Negative for photophobia.   Respiratory: Negative.    Cardiovascular: Negative.  Negative for chest pain and syncope.   Musculoskeletal: Positive for neck pain.   Skin: Negative.    Neurological: Negative.  Negative for tingling, weakness and numbness.   Psychiatric/Behavioral: Negative.        Objective   Physical Exam   Constitutional: She is oriented to person, place, and time. She appears well-developed and well-nourished.    HENT:   Head: Normocephalic.   Eyes: Pupils are equal, round, and reactive to light.   Neck: Normal range of motion. Neck supple. No thyromegaly present.   Cardiovascular: Normal rate, regular rhythm and normal heart sounds. Exam reveals no friction rub.   No murmur heard.  Pulmonary/Chest: Effort normal and breath sounds normal. No stridor. No respiratory distress. She has no wheezes. She has no rales.   Abdominal: Soft.   Musculoskeletal: Normal range of motion.        Cervical back: She exhibits tenderness, pain and spasm. She exhibits normal range of motion.   Neurological: She is alert and oriented to person, place, and time.   Skin: Skin is warm and dry.   Psychiatric: She has a normal mood and affect. Thought content normal.   Nursing note and vitals reviewed.        Assessment/Plan   Goldie was seen today for neck pain and back pain.    Diagnoses and all orders for this visit:    Neck pain  -     gabapentin (NEURONTIN) 100 MG capsule; Take 1 capsule by mouth Every Night.      Will have her cont with the nsaid and the muscle relaxer, but she will have gabapentin added.  She is encouraged to start the physical therapy.    cailin report # [id number] is reviewed.

## 2018-12-22 DIAGNOSIS — E78.2 MIXED HYPERLIPIDEMIA: ICD-10-CM

## 2018-12-22 DIAGNOSIS — M54.2 NECK PAIN: ICD-10-CM

## 2018-12-24 RX ORDER — DICLOFENAC SODIUM 75 MG/1
TABLET, DELAYED RELEASE ORAL
Qty: 60 TABLET | Refills: 3 | Status: SHIPPED | OUTPATIENT
Start: 2018-12-24 | End: 2019-05-13 | Stop reason: SDUPTHER

## 2018-12-24 RX ORDER — ATORVASTATIN CALCIUM 20 MG/1
20 TABLET, FILM COATED ORAL DAILY
Qty: 30 TABLET | Refills: 3 | Status: SHIPPED | OUTPATIENT
Start: 2018-12-24 | End: 2019-05-13 | Stop reason: SDUPTHER

## 2019-01-15 ENCOUNTER — OFFICE VISIT (OUTPATIENT)
Dept: FAMILY MEDICINE CLINIC | Facility: CLINIC | Age: 59
End: 2019-01-15

## 2019-01-15 VITALS
HEIGHT: 66 IN | HEART RATE: 103 BPM | RESPIRATION RATE: 20 BRPM | SYSTOLIC BLOOD PRESSURE: 135 MMHG | BODY MASS INDEX: 35.2 KG/M2 | DIASTOLIC BLOOD PRESSURE: 86 MMHG | WEIGHT: 219 LBS | OXYGEN SATURATION: 98 % | TEMPERATURE: 98 F

## 2019-01-15 DIAGNOSIS — M79.601 RIGHT ARM PAIN: ICD-10-CM

## 2019-01-15 DIAGNOSIS — M54.2 NECK PAIN: Primary | ICD-10-CM

## 2019-01-15 PROCEDURE — 99213 OFFICE O/P EST LOW 20 MIN: CPT | Performed by: NURSE PRACTITIONER

## 2019-01-15 RX ORDER — CYCLOBENZAPRINE HCL 10 MG
10 TABLET ORAL 3 TIMES DAILY PRN
Qty: 90 TABLET | Refills: 3 | Status: SHIPPED | OUTPATIENT
Start: 2019-01-15 | End: 2020-06-01

## 2019-01-17 NOTE — PROGRESS NOTES
Subjective   Goldie Kraus is a 58 y.o. female.     Here today for hoa on her neck pain.  She has been referred to pt and she refuses to go.  Her insurance has refused to pay for a mri of her neck until the pt has been completed.  She has been placed on a nsaid, tramadol and gabapentin.  She refuses to take them.  She also reports that the muscle relaxer does not work.  She was given an injection of cortisone the last time she was here and is requesting this again.      Neck Pain    This is a chronic problem. The problem occurs constantly. The problem has been unchanged. The pain is associated with nothing. The pain is present in the occipital region. The quality of the pain is described as aching. The pain is at a severity of 8/10. The pain is severe. The symptoms are aggravated by bending, position and stress. The pain is same all the time. Stiffness is present all day. Pertinent negatives include no chest pain, fever, headaches, leg pain, numbness, pain with swallowing, paresis, photophobia, syncope, tingling, trouble swallowing, visual change or weakness. She has tried acetaminophen, chiropractic manipulation, home exercises, muscle relaxants and NSAIDs (gabapentin) for the symptoms. The treatment provided no relief.        The following portions of the patient's history were reviewed and updated as appropriate: allergies, current medications, past family history, past medical history, past social history, past surgical history and problem list.    Review of Systems   Constitutional: Negative.  Negative for fever.   HENT: Negative.  Negative for trouble swallowing.    Eyes: Negative for photophobia.   Respiratory: Negative.    Cardiovascular: Negative.  Negative for chest pain and syncope.   Musculoskeletal: Positive for neck pain.   Neurological: Negative.  Negative for tingling, weakness and numbness.   Psychiatric/Behavioral: Negative.        Objective   Physical Exam   Constitutional: She is oriented  to person, place, and time. She appears well-developed and well-nourished. No distress.   HENT:   Head: Normocephalic.   Eyes: Pupils are equal, round, and reactive to light.   Neck: Normal range of motion. Neck supple. No thyromegaly present.   Cardiovascular: Normal rate, regular rhythm and normal heart sounds. Exam reveals no friction rub.   No murmur heard.  Pulmonary/Chest: Effort normal and breath sounds normal. No stridor. No respiratory distress. She has no wheezes. She has no rales.   Abdominal: Soft.   Musculoskeletal: Normal range of motion.        Cervical back: She exhibits tenderness, pain and spasm. She exhibits normal range of motion.   Neurological: She is alert and oriented to person, place, and time.   Skin: Skin is warm and dry.   Psychiatric: She has a normal mood and affect.   Nursing note and vitals reviewed.        Assessment/Plan   Goldie was seen today for neck pain and arm pain.    Diagnoses and all orders for this visit:    Neck pain  -     cyclobenzaprine (FLEXERIL) 10 MG tablet; Take 1 tablet by mouth 3 (Three) Times a Day As Needed for Muscle Spasms.  -     Sedimentation rate, automated  -     CROW  -     Rheumatoid Factor, Quant    Right arm pain  -     cyclobenzaprine (FLEXERIL) 10 MG tablet; Take 1 tablet by mouth 3 (Three) Times a Day As Needed for Muscle Spasms.  -     Sedimentation rate, automated  -     CROW  -     Rheumatoid Factor, Quant      Will repeat labs and is encouraged to see physical therapy.  Otherwise will see again in 3 months.

## 2019-02-16 DIAGNOSIS — K21.9 GASTROESOPHAGEAL REFLUX DISEASE, ESOPHAGITIS PRESENCE NOT SPECIFIED: ICD-10-CM

## 2019-02-16 DIAGNOSIS — I10 ESSENTIAL HYPERTENSION: ICD-10-CM

## 2019-02-18 ENCOUNTER — TELEPHONE (OUTPATIENT)
Dept: FAMILY MEDICINE CLINIC | Facility: CLINIC | Age: 59
End: 2019-02-18

## 2019-02-18 RX ORDER — LOSARTAN POTASSIUM 50 MG/1
50 TABLET ORAL DAILY
Qty: 30 TABLET | Refills: 5 | Status: SHIPPED | OUTPATIENT
Start: 2019-02-18 | End: 2019-08-28 | Stop reason: SDUPTHER

## 2019-02-18 RX ORDER — OMEPRAZOLE 40 MG/1
CAPSULE, DELAYED RELEASE ORAL
Qty: 30 CAPSULE | Refills: 5 | Status: SHIPPED | OUTPATIENT
Start: 2019-02-18 | End: 2019-08-28 | Stop reason: SDUPTHER

## 2019-02-22 ENCOUNTER — OFFICE VISIT (OUTPATIENT)
Dept: FAMILY MEDICINE CLINIC | Facility: CLINIC | Age: 59
End: 2019-02-22

## 2019-02-22 VITALS
OXYGEN SATURATION: 98 % | TEMPERATURE: 97.9 F | RESPIRATION RATE: 20 BRPM | DIASTOLIC BLOOD PRESSURE: 95 MMHG | BODY MASS INDEX: 35.03 KG/M2 | HEIGHT: 66 IN | HEART RATE: 105 BPM | WEIGHT: 218 LBS | SYSTOLIC BLOOD PRESSURE: 158 MMHG

## 2019-02-22 DIAGNOSIS — M25.532 PAIN IN BOTH WRISTS: Primary | ICD-10-CM

## 2019-02-22 DIAGNOSIS — M25.531 PAIN IN BOTH WRISTS: Primary | ICD-10-CM

## 2019-02-22 PROCEDURE — 99214 OFFICE O/P EST MOD 30 MIN: CPT | Performed by: NURSE PRACTITIONER

## 2019-02-22 RX ORDER — TRAMADOL HYDROCHLORIDE 50 MG/1
50 TABLET ORAL NIGHTLY
Qty: 30 TABLET | Refills: 0 | Status: SHIPPED | OUTPATIENT
Start: 2019-02-22 | End: 2019-04-29 | Stop reason: SDUPTHER

## 2019-02-25 ENCOUNTER — PRIOR AUTHORIZATION (OUTPATIENT)
Dept: FAMILY MEDICINE CLINIC | Facility: CLINIC | Age: 59
End: 2019-02-25

## 2019-02-25 NOTE — PROGRESS NOTES
Subjective   Goldie Zaria Kraus is a 58 y.o. female.     Here today with pain in both wrists.  The worse being on the right.  Has been going on for quite some time.  She says her work is repetitive.  She thinks she might have carpal tunnel.      Hand Pain    The incident occurred more than 1 week ago. The pain is present in the left wrist and right wrist. The quality of the pain is described as aching. The pain radiates to the left arm and right arm. The pain is at a severity of 7/10. The pain is moderate. The pain has been worsening since the incident. Associated symptoms include tingling. Pertinent negatives include no chest pain or muscle weakness. The symptoms are aggravated by movement. She has tried NSAIDs for the symptoms. The treatment provided no relief.        The following portions of the patient's history were reviewed and updated as appropriate: allergies, current medications, past family history, past medical history, past social history, past surgical history and problem list.    Review of Systems   Constitutional: Negative.    HENT: Negative.    Respiratory: Negative.    Cardiovascular: Negative for chest pain.   Musculoskeletal: Positive for arthralgias (wrists).   Skin: Negative.    Neurological: Positive for tingling.       Objective   Physical Exam   Constitutional: She is oriented to person, place, and time. She appears well-developed and well-nourished. No distress.   HENT:   Head: Normocephalic.   Eyes: Pupils are equal, round, and reactive to light.   Neck: Normal range of motion. Neck supple. No thyromegaly present.   Cardiovascular: Normal rate, regular rhythm and normal heart sounds. Exam reveals no friction rub.   No murmur heard.  Pulmonary/Chest: Effort normal and breath sounds normal. No stridor. No respiratory distress. She has no wheezes. She has no rales.   Abdominal: Soft.   Musculoskeletal: Normal range of motion.   Neg phalens and tinels signs.     Neurological: She is alert  and oriented to person, place, and time.   Skin: Skin is warm.   Psychiatric: She has a normal mood and affect. Thought content normal.   Nursing note and vitals reviewed.        Assessment/Plan   Goldie was seen today for hand pain.    Diagnoses and all orders for this visit:    Pain in both wrists  -     Ambulatory Referral to Neurology  -     traMADol (ULTRAM) 50 MG tablet; Take 1 tablet by mouth Every Night.

## 2019-04-11 ENCOUNTER — TELEPHONE (OUTPATIENT)
Dept: FAMILY MEDICINE CLINIC | Facility: CLINIC | Age: 59
End: 2019-04-11

## 2019-04-12 DIAGNOSIS — M54.2 NECK PAIN: ICD-10-CM

## 2019-04-12 RX ORDER — GABAPENTIN 100 MG/1
100 CAPSULE ORAL NIGHTLY
Qty: 30 CAPSULE | Refills: 0 | Status: SHIPPED | OUTPATIENT
Start: 2019-04-12 | End: 2019-08-05 | Stop reason: SDUPTHER

## 2019-04-12 NOTE — TELEPHONE ENCOUNTER
Patient called requesting a refill on her gabapentin.  She stated that she only has one pill left.

## 2019-04-23 ENCOUNTER — TELEPHONE (OUTPATIENT)
Dept: FAMILY MEDICINE CLINIC | Facility: CLINIC | Age: 59
End: 2019-04-23

## 2019-04-23 NOTE — TELEPHONE ENCOUNTER
Pt left  stating her appt for this week was cancelled. Is out of her tramadol and would like a RF please.

## 2019-04-29 DIAGNOSIS — M25.531 PAIN IN BOTH WRISTS: ICD-10-CM

## 2019-04-29 DIAGNOSIS — M25.532 PAIN IN BOTH WRISTS: ICD-10-CM

## 2019-04-29 RX ORDER — TRAMADOL HYDROCHLORIDE 50 MG/1
50 TABLET ORAL NIGHTLY
Qty: 30 TABLET | Refills: 0 | Status: SHIPPED | OUTPATIENT
Start: 2019-04-29 | End: 2019-06-03 | Stop reason: SDUPTHER

## 2019-05-02 ENCOUNTER — OFFICE VISIT (OUTPATIENT)
Dept: FAMILY MEDICINE CLINIC | Facility: CLINIC | Age: 59
End: 2019-05-02

## 2019-05-02 VITALS
HEIGHT: 66 IN | RESPIRATION RATE: 18 BRPM | HEART RATE: 108 BPM | DIASTOLIC BLOOD PRESSURE: 82 MMHG | BODY MASS INDEX: 34.55 KG/M2 | OXYGEN SATURATION: 98 % | SYSTOLIC BLOOD PRESSURE: 132 MMHG | WEIGHT: 215 LBS | TEMPERATURE: 98.4 F

## 2019-05-02 DIAGNOSIS — Z12.39 SCREENING FOR BREAST CANCER: ICD-10-CM

## 2019-05-02 DIAGNOSIS — J32.9 SINUSITIS, UNSPECIFIED CHRONICITY, UNSPECIFIED LOCATION: ICD-10-CM

## 2019-05-02 DIAGNOSIS — M54.2 NECK PAIN: Primary | ICD-10-CM

## 2019-05-02 PROCEDURE — 99214 OFFICE O/P EST MOD 30 MIN: CPT | Performed by: NURSE PRACTITIONER

## 2019-05-02 RX ORDER — CEFUROXIME AXETIL 500 MG/1
500 TABLET ORAL 2 TIMES DAILY
Qty: 20 TABLET | Refills: 0 | Status: SHIPPED | OUTPATIENT
Start: 2019-05-02 | End: 2020-06-01

## 2019-05-03 ENCOUNTER — TELEPHONE (OUTPATIENT)
Dept: FAMILY MEDICINE CLINIC | Facility: CLINIC | Age: 59
End: 2019-05-03

## 2019-05-03 NOTE — PATIENT INSTRUCTIONS
Calorie Counting for Weight Loss  Calories are units of energy. Your body needs a certain amount of calories from food to keep you going throughout the day. When you eat more calories than your body needs, your body stores the extra calories as fat. When you eat fewer calories than your body needs, your body burns fat to get the energy it needs.  Calorie counting means keeping track of how many calories you eat and drink each day. Calorie counting can be helpful if you need to lose weight. If you make sure to eat fewer calories than your body needs, you should lose weight. Ask your health care provider what a healthy weight is for you.  For calorie counting to work, you will need to eat the right number of calories in a day in order to lose a healthy amount of weight per week. A dietitian can help you determine how many calories you need in a day and will give you suggestions on how to reach your calorie goal.  · A healthy amount of weight to lose per week is usually 1-2 lb (0.5-0.9 kg). This usually means that your daily calorie intake should be reduced by 500-750 calories.  · Eating 1,200 - 1,500 calories per day can help most women lose weight.  · Eating 1,500 - 1,800 calories per day can help most men lose weight.    What is my plan?  My goal is to have __________ calories per day.  If I have this many calories per day, I should lose around __________ pounds per week.  What do I need to know about calorie counting?  In order to meet your daily calorie goal, you will need to:  · Find out how many calories are in each food you would like to eat. Try to do this before you eat.  · Decide how much of the food you plan to eat.  · Write down what you ate and how many calories it had. Doing this is called keeping a food log.    To successfully lose weight, it is important to balance calorie counting with a healthy lifestyle that includes regular activity. Aim for 150 minutes of moderate exercise (such as walking) or 75  minutes of vigorous exercise (such as running) each week.  Where do I find calorie information?    The number of calories in a food can be found on a Nutrition Facts label. If a food does not have a Nutrition Facts label, try to look up the calories online or ask your dietitian for help.  Remember that calories are listed per serving. If you choose to have more than one serving of a food, you will have to multiply the calories per serving by the amount of servings you plan to eat. For example, the label on a package of bread might say that a serving size is 1 slice and that there are 90 calories in a serving. If you eat 1 slice, you will have eaten 90 calories. If you eat 2 slices, you will have eaten 180 calories.  How do I keep a food log?  Immediately after each meal, record the following information in your food log:  · What you ate. Don't forget to include toppings, sauces, and other extras on the food.  · How much you ate. This can be measured in cups, ounces, or number of items.  · How many calories each food and drink had.  · The total number of calories in the meal.    Keep your food log near you, such as in a small notebook in your pocket, or use a mobile anthony or website. Some programs will calculate calories for you and show you how many calories you have left for the day to meet your goal.  What are some calorie counting tips?  · Use your calories on foods and drinks that will fill you up and not leave you hungry:  ? Some examples of foods that fill you up are nuts and nut butters, vegetables, lean proteins, and high-fiber foods like whole grains. High-fiber foods are foods with more than 5 g fiber per serving.  ? Drinks such as sodas, specialty coffee drinks, alcohol, and juices have a lot of calories, yet do not fill you up.  · Eat nutritious foods and avoid empty calories. Empty calories are calories you get from foods or beverages that do not have many vitamins or protein, such as candy, sweets, and  "soda. It is better to have a nutritious high-calorie food (such as an avocado) than a food with few nutrients (such as a bag of chips).  · Know how many calories are in the foods you eat most often. This will help you calculate calorie counts faster.  · Pay attention to calories in drinks. Low-calorie drinks include water and unsweetened drinks.  · Pay attention to nutrition labels for \"low fat\" or \"fat free\" foods. These foods sometimes have the same amount of calories or more calories than the full fat versions. They also often have added sugar, starch, or salt, to make up for flavor that was removed with the fat.  · Find a way of tracking calories that works for you. Get creative. Try different apps or programs if writing down calories does not work for you.  What are some portion control tips?  · Know how many calories are in a serving. This will help you know how many servings of a certain food you can have.  · Use a measuring cup to measure serving sizes. You could also try weighing out portions on a kitchen scale. With time, you will be able to estimate serving sizes for some foods.  · Take some time to put servings of different foods on your favorite plates, bowls, and cups so you know what a serving looks like.  · Try not to eat straight from a bag or box. Doing this can lead to overeating. Put the amount you would like to eat in a cup or on a plate to make sure you are eating the right portion.  · Use smaller plates, glasses, and bowls to prevent overeating.  · Try not to multitask (for example, watch TV or use your computer) while eating. If it is time to eat, sit down at a table and enjoy your food. This will help you to know when you are full. It will also help you to be aware of what you are eating and how much you are eating.  What are tips for following this plan?  Reading food labels  · Check the calorie count compared to the serving size. The serving size may be smaller than what you are used to " eating.  · Check the source of the calories. Make sure the food you are eating is high in vitamins and protein and low in saturated and trans fats.  Shopping  · Read nutrition labels while you shop. This will help you make healthy decisions before you decide to purchase your food.  · Make a grocery list and stick to it.  Cooking  · Try to cook your favorite foods in a healthier way. For example, try baking instead of frying.  · Use low-fat dairy products.  Meal planning  · Use more fruits and vegetables. Half of your plate should be fruits and vegetables.  · Include lean proteins like poultry and fish.  How do I count calories when eating out?  · Ask for smaller portion sizes.  · Consider sharing an entree and sides instead of getting your own entree.  · If you get your own entree, eat only half. Ask for a box at the beginning of your meal and put the rest of your entree in it so you are not tempted to eat it.  · If calories are listed on the menu, choose the lower calorie options.  · Choose dishes that include vegetables, fruits, whole grains, low-fat dairy products, and lean protein.  · Choose items that are boiled, broiled, grilled, or steamed. Stay away from items that are buttered, battered, fried, or served with cream sauce. Items labeled “crispy” are usually fried, unless stated otherwise.  · Choose water, low-fat milk, unsweetened iced tea, or other drinks without added sugar. If you want an alcoholic beverage, choose a lower calorie option such as a glass of wine or light beer.  · Ask for dressings, sauces, and syrups on the side. These are usually high in calories, so you should limit the amount you eat.  · If you want a salad, choose a garden salad and ask for grilled meats. Avoid extra toppings like messina, cheese, or fried items. Ask for the dressing on the side, or ask for olive oil and vinegar or lemon to use as dressing.  · Estimate how many servings of a food you are given. For example, a serving of  cooked rice is ½ cup or about the size of half a baseball. Knowing serving sizes will help you be aware of how much food you are eating at restaurants. The list below tells you how big or small some common portion sizes are based on everyday objects:  ? 1 oz--4 stacked dice.  ? 3 oz--1 deck of cards.  ? 1 tsp--1 die.  ? 1 Tbsp--½ a ping-pong ball.  ? 2 Tbsp--1 ping-pong ball.  ? ½ cup--½ baseball.  ? 1 cup--1 baseball.  Summary  · Calorie counting means keeping track of how many calories you eat and drink each day. If you eat fewer calories than your body needs, you should lose weight.  · A healthy amount of weight to lose per week is usually 1-2 lb (0.5-0.9 kg). This usually means reducing your daily calorie intake by 500-750 calories.  · The number of calories in a food can be found on a Nutrition Facts label. If a food does not have a Nutrition Facts label, try to look up the calories online or ask your dietitian for help.  · Use your calories on foods and drinks that will fill you up, and not on foods and drinks that will leave you hungry.  · Use smaller plates, glasses, and bowls to prevent overeating.  This information is not intended to replace advice given to you by your health care provider. Make sure you discuss any questions you have with your health care provider.  Document Released: 12/18/2006 Document Revised: 11/17/2017 Document Reviewed: 11/17/2017  [employer] Interactive Patient Education © 2019 [employer]      Exercising to Lose Weight  Exercising can help you to lose weight. In order to lose weight through exercise, you need to do vigorous-intensity exercise. You can tell that you are exercising with vigorous intensity if you are breathing very hard and fast and cannot hold a conversation while exercising.  Moderate-intensity exercise helps to maintain your current weight. You can tell that you are exercising at a moderate level if you have a higher heart rate and faster breathing, but you are  still able to hold a conversation.  How often should I exercise?  Choose an activity that you enjoy and set realistic goals. Your health care provider can help you to make an activity plan that works for you. Exercise regularly as directed by your health care provider. This may include:  · Doing resistance training twice each week, such as:  ? Push-ups.  ? Sit-ups.  ? Lifting weights.  ? Using resistance bands.  · Doing a given intensity of exercise for a given amount of time. Choose from these options:  ? 150 minutes of moderate-intensity exercise every week.  ? 75 minutes of vigorous-intensity exercise every week.  ? A mix of moderate-intensity and vigorous-intensity exercise every week.    Children, pregnant women, people who are out of shape, people who are overweight, and older adults may need to consult a health care provider for individual recommendations. If you have any sort of medical condition, be sure to consult your health care provider before starting a new exercise program.  What are some activities that can help me to lose weight?  · Walking at a rate of at least 4.5 miles an hour.  · Jogging or running at a rate of 5 miles per hour.  · Biking at a rate of at least 10 miles per hour.  · Lap swimming.  · Roller-skating or in-line skating.  · Cross-country skiing.  · Vigorous competitive sports, such as football, basketball, and soccer.  · Jumping rope.  · Aerobic dancing.  How can I be more active in my day-to-day activities?  · Use the stairs instead of the elevator.  · Take a walk during your lunch break.  · If you drive, park your car farther away from work or school.  · If you take public transportation, get off one stop early and walk the rest of the way.  · Make all of your phone calls while standing up and walking around.  · Get up, stretch, and walk around every 30 minutes throughout the day.  What guidelines should I follow while exercising?  · Do not exercise so much that you hurt yourself,  feel dizzy, or get very short of breath.  · Consult your health care provider prior to starting a new exercise program.  · Wear comfortable clothes and shoes with good support.  · Drink plenty of water while you exercise to prevent dehydration or heat stroke. Body water is lost during exercise and must be replaced.  · Work out until you breathe faster and your heart beats faster.  This information is not intended to replace advice given to you by your health care provider. Make sure you discuss any questions you have with your health care provider.  Document Released: 01/20/2012 Document Revised: 05/25/2017 Document Reviewed: 05/21/2015  [employer] Interactive Patient Education © 2019 [employer]

## 2019-05-03 NOTE — PROGRESS NOTES
Subjective   Goldie Kraus is a 58 y.o. female.     Here today for hoa on her neck pain.  She has seen a neurosurgeon in [place], dr stovall who has sent her to pt.  Have tried in the past to get her to go to pt and she has refused.  She says she has been going for the past 3 weeks and it is making the pain worse.  She still wants a mri.      She has sinus congestion and pain for the past 3 days.  Has taken advil sinus for sx.      Neck Pain    The current episode started more than 1 year ago. The problem occurs constantly. The problem has been waxing and waning. The pain is associated with nothing. The pain is present in the occipital region. The quality of the pain is described as aching. The pain is at a severity of 4/10. The pain is moderate. The symptoms are aggravated by twisting, stress and position. The pain is same all the time. Stiffness is present in the morning. Pertinent negatives include no chest pain, fever, headaches, leg pain, numbness, pain with swallowing, paresis, photophobia, syncope, tingling, trouble swallowing, visual change, weakness or weight loss. She has tried home exercises, bed rest, muscle relaxants and NSAIDs (physical therapy) for the symptoms. The treatment provided no relief.   Sinusitis   This is a new problem. The current episode started in the past 7 days. The problem is unchanged. There has been no fever. Her pain is at a severity of 3/10. The pain is mild. Associated symptoms include congestion, coughing, neck pain and sinus pressure. Pertinent negatives include no chills, diaphoresis, ear pain, headaches, hoarse voice, shortness of breath, sneezing, sore throat or swollen glands. Past treatments include oral decongestants. The treatment provided moderate relief.        The following portions of the patient's history were reviewed and updated as appropriate: allergies, current medications, past family history, past medical history, past social history, past  surgical history and problem list.    Review of Systems   Constitutional: Negative.  Negative for chills, diaphoresis, fever and unexpected weight loss.   HENT: Positive for congestion and sinus pressure. Negative for ear pain, hoarse voice, sneezing, sore throat and trouble swallowing.    Eyes: Negative.  Negative for photophobia.   Respiratory: Positive for cough. Negative for shortness of breath.    Cardiovascular: Negative.  Negative for chest pain and syncope.   Gastrointestinal: Negative.    Endocrine: Negative.    Genitourinary: Negative.    Musculoskeletal: Positive for neck pain.   Skin: Negative.    Allergic/Immunologic: Negative.    Neurological: Negative.  Negative for tingling, weakness and numbness.   Hematological: Negative.    Psychiatric/Behavioral: Negative.        Objective   Physical Exam   Constitutional: She is oriented to person, place, and time. She appears well-developed and well-nourished. No distress.   HENT:   Head: Normocephalic and atraumatic.   Right Ear: Hearing, tympanic membrane, external ear and ear canal normal.   Left Ear: Hearing, tympanic membrane, external ear and ear canal normal.   Nose: Right sinus exhibits maxillary sinus tenderness. Right sinus exhibits no frontal sinus tenderness. Left sinus exhibits maxillary sinus tenderness. Left sinus exhibits no frontal sinus tenderness.   Mouth/Throat: Uvula is midline, oropharynx is clear and moist and mucous membranes are normal. No oropharyngeal exudate.   Eyes: Pupils are equal, round, and reactive to light.   Neck: Normal range of motion. Neck supple. No thyromegaly present.   Cardiovascular: Normal rate, regular rhythm and normal heart sounds. Exam reveals no friction rub.   No murmur heard.  Pulmonary/Chest: Effort normal and breath sounds normal. No respiratory distress. She has no wheezes. She has no rales.   Abdominal: Soft.   Musculoskeletal:        Cervical back: She exhibits decreased range of motion, pain and spasm.    Neurological: She is alert and oriented to person, place, and time.   Skin: Skin is warm and dry.   Psychiatric: She has a normal mood and affect. Thought content normal.   Nursing note and vitals reviewed.        Assessment/Plan   Goldie was seen today for neck pain, hypertension and peripheral neuropathy.    Diagnoses and all orders for this visit:    Neck pain  Comments:  we need copies of her pt reports before we can submit a request for her mri    Screening for breast cancer  -     Mammo Screening Bilateral With CAD; Future    Sinusitis, unspecified chronicity, unspecified location  -     cefuroxime (CEFTIN) 500 MG tablet; Take 1 tablet by mouth 2 (Two) Times a Day.  -     loratadine-pseudoephedrine (CLARITIN-D 24 HOUR)  MG per 24 hr tablet; Take 1 tablet by mouth Daily.

## 2019-05-13 DIAGNOSIS — M54.2 NECK PAIN: ICD-10-CM

## 2019-05-13 DIAGNOSIS — E78.2 MIXED HYPERLIPIDEMIA: ICD-10-CM

## 2019-05-13 RX ORDER — DICLOFENAC SODIUM 75 MG/1
TABLET, DELAYED RELEASE ORAL
Qty: 60 TABLET | Refills: 4 | Status: SHIPPED | OUTPATIENT
Start: 2019-05-13 | End: 2020-06-01

## 2019-05-13 RX ORDER — ATORVASTATIN CALCIUM 20 MG/1
20 TABLET, FILM COATED ORAL DAILY
Qty: 30 TABLET | Refills: 4 | Status: SHIPPED | OUTPATIENT
Start: 2019-05-13 | End: 2019-11-06 | Stop reason: SDUPTHER

## 2019-05-31 ENCOUNTER — TELEPHONE (OUTPATIENT)
Dept: FAMILY MEDICINE CLINIC | Facility: CLINIC | Age: 59
End: 2019-05-31

## 2019-06-03 DIAGNOSIS — M25.532 PAIN IN BOTH WRISTS: ICD-10-CM

## 2019-06-03 DIAGNOSIS — M25.531 PAIN IN BOTH WRISTS: ICD-10-CM

## 2019-06-03 RX ORDER — TRAMADOL HYDROCHLORIDE 50 MG/1
50 TABLET ORAL NIGHTLY
Qty: 30 TABLET | Refills: 0 | Status: SHIPPED | OUTPATIENT
Start: 2019-06-03 | End: 2019-08-05 | Stop reason: SDUPTHER

## 2019-06-07 ENCOUNTER — PRIOR AUTHORIZATION (OUTPATIENT)
Dept: FAMILY MEDICINE CLINIC | Facility: CLINIC | Age: 59
End: 2019-06-07

## 2019-08-05 DIAGNOSIS — M54.2 NECK PAIN: ICD-10-CM

## 2019-08-05 DIAGNOSIS — M25.531 PAIN IN BOTH WRISTS: ICD-10-CM

## 2019-08-05 DIAGNOSIS — M25.532 PAIN IN BOTH WRISTS: ICD-10-CM

## 2019-08-05 RX ORDER — GABAPENTIN 100 MG/1
100 CAPSULE ORAL NIGHTLY
Qty: 30 CAPSULE | Refills: 0 | Status: SHIPPED | OUTPATIENT
Start: 2019-08-05 | End: 2019-09-09 | Stop reason: SDUPTHER

## 2019-08-05 RX ORDER — TRAMADOL HYDROCHLORIDE 50 MG/1
50 TABLET ORAL NIGHTLY
Qty: 30 TABLET | Refills: 0 | Status: SHIPPED | OUTPATIENT
Start: 2019-08-05 | End: 2019-09-09 | Stop reason: SDUPTHER

## 2019-08-28 DIAGNOSIS — K21.9 GASTROESOPHAGEAL REFLUX DISEASE, ESOPHAGITIS PRESENCE NOT SPECIFIED: ICD-10-CM

## 2019-08-28 DIAGNOSIS — I10 ESSENTIAL HYPERTENSION: ICD-10-CM

## 2019-08-28 RX ORDER — LOSARTAN POTASSIUM 50 MG/1
50 TABLET ORAL DAILY
Qty: 30 TABLET | Refills: 0 | Status: SHIPPED | OUTPATIENT
Start: 2019-08-28 | End: 2019-09-30 | Stop reason: SDUPTHER

## 2019-08-28 RX ORDER — OMEPRAZOLE 40 MG/1
CAPSULE, DELAYED RELEASE ORAL
Qty: 30 CAPSULE | Refills: 0 | Status: SHIPPED | OUTPATIENT
Start: 2019-08-28 | End: 2019-09-30 | Stop reason: SDUPTHER

## 2019-09-09 DIAGNOSIS — M25.532 PAIN IN BOTH WRISTS: ICD-10-CM

## 2019-09-09 DIAGNOSIS — M25.531 PAIN IN BOTH WRISTS: ICD-10-CM

## 2019-09-09 DIAGNOSIS — M54.2 NECK PAIN: ICD-10-CM

## 2019-09-09 RX ORDER — GABAPENTIN 100 MG/1
100 CAPSULE ORAL NIGHTLY
Qty: 30 CAPSULE | Refills: 0 | Status: SHIPPED | OUTPATIENT
Start: 2019-09-09 | End: 2020-06-01

## 2019-09-09 RX ORDER — TRAMADOL HYDROCHLORIDE 50 MG/1
50 TABLET ORAL NIGHTLY
Qty: 30 TABLET | Refills: 0 | Status: SHIPPED | OUTPATIENT
Start: 2019-09-09 | End: 2020-06-01

## 2019-09-18 ENCOUNTER — TELEPHONE (OUTPATIENT)
Dept: FAMILY MEDICINE CLINIC | Facility: CLINIC | Age: 59
End: 2019-09-18

## 2019-09-30 DIAGNOSIS — I10 ESSENTIAL HYPERTENSION: ICD-10-CM

## 2019-09-30 DIAGNOSIS — K21.9 GASTROESOPHAGEAL REFLUX DISEASE, ESOPHAGITIS PRESENCE NOT SPECIFIED: ICD-10-CM

## 2019-09-30 RX ORDER — OMEPRAZOLE 40 MG/1
CAPSULE, DELAYED RELEASE ORAL
Qty: 30 CAPSULE | Refills: 6 | Status: SHIPPED | OUTPATIENT
Start: 2019-09-30 | End: 2020-06-02

## 2019-09-30 RX ORDER — LOSARTAN POTASSIUM 50 MG/1
50 TABLET ORAL DAILY
Qty: 30 TABLET | Refills: 6 | Status: SHIPPED | OUTPATIENT
Start: 2019-09-30 | End: 2020-12-24 | Stop reason: SDUPTHER

## 2019-10-16 NOTE — TELEPHONE ENCOUNTER
Gave Zanaflex yesterday insurance will not pay for it can you change to something else please     Universal Safety Interventions

## 2019-11-06 DIAGNOSIS — E78.2 MIXED HYPERLIPIDEMIA: ICD-10-CM

## 2019-11-06 RX ORDER — ATORVASTATIN CALCIUM 20 MG/1
20 TABLET, FILM COATED ORAL DAILY
Qty: 30 TABLET | Refills: 3 | Status: SHIPPED | OUTPATIENT
Start: 2019-11-06 | End: 2020-03-23

## 2020-03-23 DIAGNOSIS — E78.2 MIXED HYPERLIPIDEMIA: ICD-10-CM

## 2020-03-23 RX ORDER — ATORVASTATIN CALCIUM 20 MG/1
20 TABLET, FILM COATED ORAL DAILY
Qty: 30 TABLET | Refills: 3 | Status: SHIPPED | OUTPATIENT
Start: 2020-03-23 | End: 2020-12-24 | Stop reason: SDUPTHER

## 2020-06-01 ENCOUNTER — OFFICE VISIT (OUTPATIENT)
Dept: FAMILY MEDICINE CLINIC | Facility: CLINIC | Age: 60
End: 2020-06-01

## 2020-06-01 VITALS
OXYGEN SATURATION: 99 % | BODY MASS INDEX: 34.15 KG/M2 | TEMPERATURE: 98.2 F | HEART RATE: 96 BPM | DIASTOLIC BLOOD PRESSURE: 96 MMHG | SYSTOLIC BLOOD PRESSURE: 146 MMHG | WEIGHT: 212.5 LBS | RESPIRATION RATE: 20 BRPM | HEIGHT: 66 IN

## 2020-06-01 DIAGNOSIS — E66.9 OBESITY (BMI 30.0-34.9): ICD-10-CM

## 2020-06-01 DIAGNOSIS — I10 ESSENTIAL HYPERTENSION: ICD-10-CM

## 2020-06-01 DIAGNOSIS — N94.9 VAGINAL BURNING: Primary | ICD-10-CM

## 2020-06-01 LAB
BILIRUB BLD-MCNC: NEGATIVE MG/DL
CLARITY, POC: CLEAR
COLOR UR: YELLOW
GLUCOSE UR STRIP-MCNC: NEGATIVE MG/DL
KETONES UR QL: NEGATIVE
LEUKOCYTE EST, POC: NEGATIVE
NITRITE UR-MCNC: NEGATIVE MG/ML
PH UR: 6 [PH] (ref 5–8)
PROT UR STRIP-MCNC: NEGATIVE MG/DL
RBC # UR STRIP: NEGATIVE /UL
SP GR UR: 1.01 (ref 1–1.03)
UROBILINOGEN UR QL: NORMAL

## 2020-06-01 PROCEDURE — 87660 TRICHOMONAS VAGIN DIR PROBE: CPT | Performed by: NURSE PRACTITIONER

## 2020-06-01 PROCEDURE — 87510 GARDNER VAG DNA DIR PROBE: CPT | Performed by: NURSE PRACTITIONER

## 2020-06-01 PROCEDURE — 87591 N.GONORRHOEAE DNA AMP PROB: CPT | Performed by: NURSE PRACTITIONER

## 2020-06-01 PROCEDURE — 87491 CHLMYD TRACH DNA AMP PROBE: CPT | Performed by: NURSE PRACTITIONER

## 2020-06-01 PROCEDURE — 87480 CANDIDA DNA DIR PROBE: CPT | Performed by: NURSE PRACTITIONER

## 2020-06-01 PROCEDURE — 99213 OFFICE O/P EST LOW 20 MIN: CPT | Performed by: NURSE PRACTITIONER

## 2020-06-01 PROCEDURE — 87661 TRICHOMONAS VAGINALIS AMPLIF: CPT | Performed by: NURSE PRACTITIONER

## 2020-06-01 NOTE — PROGRESS NOTES
"Subjective   Goldie Zaria Kraus is a 60 y.o. female.     [facility] Day Appointment    PCP: HORACIO Mendieta    CC: \"burning around vaginal area\"    Sexually active with same partner x 15 years, minimal concern for STD, but would like to be checked.  Menopause x 10 years, never been on HRT.  Does have vaginal dryness, doesn't use lubricant.      Has Losartan for HTN, but admits she doesn't take it regularly.        Vaginitis   This is a new problem. The current episode started 1 to 4 weeks ago (x 2 weeks). The problem occurs daily. The problem has been unchanged. Associated symptoms include neck pain (chronic, no change). Pertinent negatives include no abdominal pain, anorexia, arthralgias, change in bowel habit, chest pain, chills, congestion, coughing, diaphoresis, fatigue, fever, headaches, joint swelling, myalgias, nausea, numbness, rash, sore throat, swollen glands, urinary symptoms, vertigo, visual change, vomiting or weakness. Nothing aggravates the symptoms. She has tried nothing for the symptoms.        The following portions of the patient's history were reviewed and updated as appropriate: allergies, current medications, past medical history, past social history, past surgical history and problem list.    Review of Systems   Constitutional: Negative for chills, diaphoresis, fatigue and fever.   HENT: Negative for congestion, sore throat and swollen glands.    Respiratory: Negative.  Negative for cough.    Cardiovascular: Negative.  Negative for chest pain.   Gastrointestinal: Negative for abdominal pain, anorexia, change in bowel habit, diarrhea, nausea and vomiting.   Genitourinary: Positive for vaginal pain (burning). Negative for difficulty urinating, dyspareunia, dysuria, flank pain, frequency, genital sores, pelvic pressure, urgency, urinary incontinence and vaginal discharge.   Musculoskeletal: Positive for neck pain (chronic, no change). Negative for arthralgias, joint swelling and myalgias.   Skin: " "Negative for rash.   Neurological: Negative for vertigo, weakness, numbness and headache.     /96 (BP Location: Right arm, Patient Position: Sitting, Cuff Size: Large Adult)   Pulse 96   Temp 98.2 °F (36.8 °C) (Tympanic)   Resp 20   Ht 167.6 cm (66\")   Wt 96.4 kg (212 lb 8 oz)   LMP 05/23/2010   SpO2 99%   BMI 34.30 kg/m²     Objective   Physical Exam   Constitutional: She is oriented to person, place, and time. She appears well-developed and well-nourished. No distress.   Cardiovascular: Normal rate and regular rhythm.   Pulmonary/Chest: Effort normal and breath sounds normal. She has no wheezes.   Abdominal: Soft. Bowel sounds are normal. There is no tenderness. There is no rebound and no guarding.   Genitourinary:   Genitourinary Comments: Exam deferred per patient   Neurological: She is alert and oriented to person, place, and time.   Nursing note and vitals reviewed.    Recent Results (from the past 24 hour(s))   POCT urinalysis dipstick, automated    Collection Time: 06/01/20  2:39 PM   Result Value Ref Range    Color Yellow Yellow, Straw, Dark Yellow, Maren    Clarity, UA Clear Clear    Specific Gravity  1.010 1.005 - 1.030    pH, Urine 6.0 5.0 - 8.0    Leukocytes Negative Negative    Nitrite, UA Negative Negative    Protein, POC Negative Negative mg/dL    Glucose, UA Negative Negative, 1000 mg/dL (3+) mg/dL    Ketones, UA Negative Negative    Urobilinogen, UA Normal Normal    Bilirubin Negative Negative    Blood, UA Negative Negative     No Images in the past 120 days found..      Assessment/Plan   Diagnoses and all orders for this visit:    Vaginal burning  -     POCT urinalysis dipstick, automated  -     Gardnerella vaginalis, Trichomonas vaginalis, Candida albicans, DNA - Swab, Vagina  -     Chlamydia trachomatis, Neisseria gonorrhoeae, Trichomonas vaginalis, PCR - Swab, Vagina    Essential hypertension    Obesity (BMI 30.0-34.9)      U/A WNL.  Vaginal swabs pending--will call with results " when available.  Discussed possible atrophic vaginitis as cause of burning and encouraged use of lubricants as needed.   Encouraged to take Losartan daily for HTN as prescribed    Patient's Body mass index is 34.3 kg/m². BMI is below normal parameters. Recommendations include: referral to primary care.    See PCP or RTC if symptoms persist/worsen  See PCP for routine f/u visit and management of chronic medical conditions      This document has been electronically signed by HORACIO Mcmanus on June 1, 2020 15:06,.

## 2020-06-01 NOTE — PATIENT INSTRUCTIONS
Atrophic Vaginitis  Atrophic vaginitis is a condition in which the tissues that line the vagina become dry and thin. This condition occurs in women who have stopped having their period. It is caused by a drop in a female hormone (estrogen). This hormone helps:  · To keep the vagina moist.  · To make a clear fluid. This clear fluid helps:  ? To make the vagina ready for sex.  ? To protect the vagina from infection.  If the lining of the vagina is dry and thin, it may cause irritation, burning, or itchiness. It may also:  · Make sex painful.  · Make an exam of your vagina painful.  · Cause bleeding.  · Make you lose interest in sex.  · Cause a burning feeling when you pee (urinate).  · Cause a brown or yellow fluid to come from your vagina.  Some women do not have symptoms.  Follow these instructions at home:  Medicines  · Take over-the-counter and prescription medicines only as told by your doctor.  · Do not use herbs or other medicines unless your doctor says it is okay.  · Use medicines for for dryness. These include:  ? Oils to make the vagina soft.  ? Creams.  ? Moisturizers.  General instructions  · Do not douche.  · Do not use products that can make your vagina dry. These include:  ? Scented sprays.  ? Scented tampons.  ? Scented soaps.  · Sex can help increase blood flow and soften the tissue in the vagina. If it hurts to have sex:  ? Tell your partner.  ? Use products to make sex more comfortable. Use these only as told by your doctor.  Contact a doctor if you:  · Have discharge from the vagina that is different than usual.  · Have a bad smell coming from your vagina.  · Have new symptoms.  · Do not get better.  · Get worse.  Summary  · Atrophic vaginitis is a condition in which the lining of the vagina becomes dry and thin.  · This condition affects women who have stopped having their periods.  · Treatment may include using products that help make the vagina soft.  · Call a doctor if do not get better with  treatment.  This information is not intended to replace advice given to you by your health care provider. Make sure you discuss any questions you have with your health care provider.  Document Released: 06/05/2009 Document Revised: 12/31/2018 Document Reviewed: 12/31/2018  [employer]vier Patient Education © 2020 [employer]

## 2020-06-02 DIAGNOSIS — K21.9 GASTROESOPHAGEAL REFLUX DISEASE, ESOPHAGITIS PRESENCE NOT SPECIFIED: ICD-10-CM

## 2020-06-02 LAB
CANDIDA ALBICANS: NEGATIVE
GARDNERELLA VAGINALIS: POSITIVE
T VAGINALIS DNA VAG QL PROBE+SIG AMP: NEGATIVE

## 2020-06-02 RX ORDER — OMEPRAZOLE 40 MG/1
CAPSULE, DELAYED RELEASE ORAL
Qty: 30 CAPSULE | Refills: 0 | Status: SHIPPED | OUTPATIENT
Start: 2020-06-02 | End: 2020-06-30

## 2020-06-03 DIAGNOSIS — B96.89 BV (BACTERIAL VAGINOSIS): Primary | ICD-10-CM

## 2020-06-03 DIAGNOSIS — N76.0 BV (BACTERIAL VAGINOSIS): Primary | ICD-10-CM

## 2020-06-03 LAB
C TRACH RRNA CVX QL NAA+PROBE: NEGATIVE
N GONORRHOEA RRNA SPEC QL NAA+PROBE: NEGATIVE
TRICHOMONAS VAGINALIS PCR: NEGATIVE

## 2020-06-03 RX ORDER — METRONIDAZOLE 7.5 MG/G
GEL VAGINAL NIGHTLY
Qty: 70 G | Refills: 0 | Status: SHIPPED | OUTPATIENT
Start: 2020-06-03 | End: 2020-06-08

## 2020-06-30 DIAGNOSIS — K21.9 GASTROESOPHAGEAL REFLUX DISEASE, ESOPHAGITIS PRESENCE NOT SPECIFIED: ICD-10-CM

## 2020-06-30 RX ORDER — OMEPRAZOLE 40 MG/1
CAPSULE, DELAYED RELEASE ORAL
Qty: 30 CAPSULE | Refills: 0 | Status: SHIPPED | OUTPATIENT
Start: 2020-06-30 | End: 2020-08-19

## 2020-07-28 DIAGNOSIS — K21.9 GASTROESOPHAGEAL REFLUX DISEASE, ESOPHAGITIS PRESENCE NOT SPECIFIED: ICD-10-CM

## 2020-07-28 RX ORDER — OMEPRAZOLE 40 MG/1
CAPSULE, DELAYED RELEASE ORAL
Qty: 30 CAPSULE | Refills: 0 | OUTPATIENT
Start: 2020-07-28

## 2020-08-19 DIAGNOSIS — K21.9 GASTROESOPHAGEAL REFLUX DISEASE, ESOPHAGITIS PRESENCE NOT SPECIFIED: ICD-10-CM

## 2020-08-19 RX ORDER — OMEPRAZOLE 40 MG/1
CAPSULE, DELAYED RELEASE ORAL
Qty: 30 CAPSULE | Refills: 3 | Status: SHIPPED | OUTPATIENT
Start: 2020-08-19 | End: 2020-12-24 | Stop reason: SDUPTHER

## 2020-08-26 ENCOUNTER — TELEPHONE (OUTPATIENT)
Dept: FAMILY MEDICINE CLINIC | Facility: CLINIC | Age: 60
End: 2020-08-26

## 2020-08-26 NOTE — TELEPHONE ENCOUNTER
PATIENT STATES THAT FOR THE LAST 5 DAYS NOSE AND EYES HAVE BEEN BURNING AND THAT SHE HAS SMELT NATURAL GAS EVERY WHERE SHE WENT PATIENT IS REQUESTING A CALL BACK TO DISCUSS CONCERNS IN REGARDS TO THIS .

## 2020-10-25 DIAGNOSIS — I10 ESSENTIAL HYPERTENSION: ICD-10-CM

## 2020-10-26 RX ORDER — LOSARTAN POTASSIUM 50 MG/1
50 TABLET ORAL DAILY
Qty: 30 TABLET | Refills: 6 | OUTPATIENT
Start: 2020-10-26

## 2020-12-14 ENCOUNTER — OFFICE VISIT (OUTPATIENT)
Dept: FAMILY MEDICINE CLINIC | Facility: CLINIC | Age: 60
End: 2020-12-14

## 2020-12-14 VITALS
HEIGHT: 66 IN | HEART RATE: 91 BPM | RESPIRATION RATE: 20 BRPM | TEMPERATURE: 97.3 F | WEIGHT: 208.38 LBS | SYSTOLIC BLOOD PRESSURE: 126 MMHG | OXYGEN SATURATION: 99 % | DIASTOLIC BLOOD PRESSURE: 88 MMHG | BODY MASS INDEX: 33.49 KG/M2

## 2020-12-14 DIAGNOSIS — H60.503 ACUTE OTITIS EXTERNA OF BOTH EARS, UNSPECIFIED TYPE: Primary | ICD-10-CM

## 2020-12-14 PROCEDURE — 99213 OFFICE O/P EST LOW 20 MIN: CPT | Performed by: NURSE PRACTITIONER

## 2020-12-14 RX ORDER — NEOMYCIN SULFATE, POLYMYXIN B SULFATE, HYDROCORTISONE 3.5; 10000; 1 MG/ML; [USP'U]/ML; MG/ML
3 SOLUTION/ DROPS AURICULAR (OTIC) 3 TIMES DAILY
Qty: 10 ML | Refills: 0 | Status: SHIPPED | OUTPATIENT
Start: 2020-12-14 | End: 2020-12-24

## 2020-12-14 NOTE — PATIENT INSTRUCTIONS
Otitis Externa    Otitis externa is an infection of the outer ear canal. The outer ear canal is the area between the outside of the ear and the eardrum. Otitis externa is sometimes called swimmer's ear.  What are the causes?  Common causes of this condition include:  · Swimming in dirty water.  · Moisture in the ear.  · An injury to the inside of the ear.  · An object stuck in the ear.  · A cut or scrape on the outside of the ear.  What increases the risk?  You are more likely to develop this condition if you go swimming often.  What are the signs or symptoms?  The first symptom of this condition is often itching in the ear. Later symptoms of the condition include:  · Swelling of the ear.  · Redness in the ear.  · Ear pain. The pain may get worse when you pull on your ear.  · Pus coming from the ear.  How is this diagnosed?  This condition may be diagnosed by examining the ear and testing fluid from the ear for bacteria and funguses.  How is this treated?  This condition may be treated with:  · Antibiotic ear drops. These are often given for 10-14 days.  · Medicines to reduce itching and swelling.  Follow these instructions at home:  · If you were prescribed antibiotic ear drops, use them as told by your health care provider. Do not stop using the antibiotic even if your condition improves.  · Take over-the-counter and prescription medicines only as told by your health care provider.  · Avoid getting water in your ears as told by your health care provider. This may include avoiding swimming or water sports for a few days.  · Keep all follow-up visits as told by your health care provider. This is important.  How is this prevented?  · Keep your ears dry. Use the corner of a towel to dry your ears after you swim or bathe.  · Avoid scratching or putting things in your ear. Doing these things can damage the ear canal or remove the protective wax that lines it, which makes it easier for bacteria and funguses to  grow.  · Avoid swimming in lakes, polluted water, or pools that may not have enough chlorine.  Contact a health care provider if:  · You have a fever.  · Your ear is still red, swollen, painful, or draining pus after 3 days.  · Your redness, swelling, or pain gets worse.  · You have a severe headache.  · You have redness, swelling, pain, or tenderness in the area behind your ear.  Summary  · Otitis externa is an infection of the outer ear canal.  · Common causes include swimming in dirty water, moisture in the ear, or a cut or scrape in the ear.  · Symptoms include pain, redness, and swelling of the ear.  · If you were prescribed antibiotic ear drops, use them as told by your health care provider. Do not stop using the antibiotic even if your condition improves.  This information is not intended to replace advice given to you by your health care provider. Make sure you discuss any questions you have with your health care provider.  Document Revised: 05/24/2019 Document Reviewed: 05/24/2019  [employer] Patient Education © 2020 [employer]

## 2020-12-14 NOTE — PROGRESS NOTES
"Subjective   Goldie Zaria Kraus is a 60 y.o. female.     [facility]    PCP: HORACIO Coyle    CC: \"earache\"    Earache   There is pain in both (L>R) ears. The current episode started 1 to 4 weeks ago (\"a couple of weeks\"). The problem occurs hourly. The problem has been unchanged. There has been no fever. The pain is at a severity of 3/10. The pain is mild. Pertinent negatives include no abdominal pain, coughing, diarrhea, ear discharge, headaches, hearing loss, neck pain, rash, rhinorrhea, sore throat or vomiting. Treatments tried: mineral oil. The treatment provided no relief. had something similar 4-5 years ago, seen by ENT, given drops and improved        The following portions of the patient's history were reviewed and updated as appropriate: allergies, current medications, past medical history, past social history, past surgical history and problem list.    Review of Systems   Constitutional: Negative.    HENT: Positive for ear pain and sinus pressure (chronic, no changes). Negative for congestion, ear discharge, hearing loss, postnasal drip, rhinorrhea, sneezing, sore throat and trouble swallowing.    Eyes: Negative.    Respiratory: Negative.  Negative for cough.    Cardiovascular: Negative.    Gastrointestinal: Negative for abdominal pain, diarrhea, nausea and vomiting.   Genitourinary: Negative for difficulty urinating.   Musculoskeletal: Negative for neck pain.   Skin: Negative for rash.   Neurological: Negative for dizziness and headache.     /88 (BP Location: Right arm, Patient Position: Sitting, Cuff Size: Adult)   Pulse 91   Temp 97.3 °F (36.3 °C) (Temporal)   Resp 20   Ht 167.6 cm (66\")   Wt 94.5 kg (208 lb 6 oz)   LMP 05/23/2010   SpO2 99%   BMI 33.63 kg/m²     Objective   Physical Exam  Vitals signs and nursing note reviewed.   Constitutional:       General: She is not in acute distress.     Appearance: Normal appearance.   HENT:      Head: Normocephalic and " atraumatic.      Right Ear: Tympanic membrane normal. Swelling ( mild) and tenderness ( mild) present. No drainage.      Left Ear: Tympanic membrane normal. Swelling and tenderness present. No drainage.      Nose: Nose normal.      Mouth/Throat:      Mouth: Mucous membranes are moist.      Pharynx: Oropharynx is clear. No oropharyngeal exudate or posterior oropharyngeal erythema.   Eyes:      General:         Right eye: No discharge.         Left eye: No discharge.      Conjunctiva/sclera: Conjunctivae normal.   Neck:      Musculoskeletal: Neck supple. No muscular tenderness.   Cardiovascular:      Rate and Rhythm: Normal rate and regular rhythm.   Pulmonary:      Effort: Pulmonary effort is normal. No respiratory distress.      Breath sounds: Normal breath sounds. No wheezing, rhonchi or rales.   Lymphadenopathy:      Cervical: No cervical adenopathy.   Neurological:      General: No focal deficit present.      Mental Status: She is alert and oriented to person, place, and time.       No results found for this or any previous visit (from the past 24 hour(s)).  No Images in the past 120 days found..      Assessment/Plan   Diagnoses and all orders for this visit:    1. Acute otitis externa of both ears, unspecified type (Primary)  -     neomycin-polymyxin-hydrocortisone (CORTISPORIN) 1 % solution otic solution; Administer 3 drops into both ears 3 (Three) Times a Day for 7 days.  Dispense: 10 mL; Refill: 0    Rx for Cortisporin Otic solution provided.   Advised to not use qtips in the ears.   Avoid use of ear plugs/ear buds until swelling improves    Patient's Body mass index is 33.63 kg/m². BMI is above normal parameters. Recommendations include: referral to primary care.    See PCP or RTC if symptoms persist/worsen--has f/u with PCP on 12-17  See PCP for routine f/u visit and management of chronic medical conditions      This document has been electronically signed by HORACIO Mcmanus on December 14, 2020 13:40  CST,.

## 2020-12-17 ENCOUNTER — OFFICE VISIT (OUTPATIENT)
Dept: FAMILY MEDICINE CLINIC | Facility: CLINIC | Age: 60
End: 2020-12-17

## 2020-12-17 VITALS
HEIGHT: 66 IN | OXYGEN SATURATION: 97 % | BODY MASS INDEX: 33.59 KG/M2 | RESPIRATION RATE: 20 BRPM | TEMPERATURE: 97.8 F | HEART RATE: 59 BPM | WEIGHT: 209 LBS | DIASTOLIC BLOOD PRESSURE: 87 MMHG | SYSTOLIC BLOOD PRESSURE: 136 MMHG

## 2020-12-17 DIAGNOSIS — E78.2 MIXED HYPERLIPIDEMIA: ICD-10-CM

## 2020-12-17 DIAGNOSIS — I10 ESSENTIAL HYPERTENSION: ICD-10-CM

## 2020-12-17 DIAGNOSIS — K21.9 GASTROESOPHAGEAL REFLUX DISEASE: ICD-10-CM

## 2020-12-17 DIAGNOSIS — Z12.31 ENCOUNTER FOR SCREENING MAMMOGRAM FOR MALIGNANT NEOPLASM OF BREAST: ICD-10-CM

## 2020-12-17 DIAGNOSIS — L98.9 SKIN LESION: ICD-10-CM

## 2020-12-17 DIAGNOSIS — M19.90 ARTHRITIS: Primary | ICD-10-CM

## 2020-12-17 PROCEDURE — 99214 OFFICE O/P EST MOD 30 MIN: CPT | Performed by: NURSE PRACTITIONER

## 2020-12-17 RX ORDER — MELOXICAM 15 MG/1
15 TABLET ORAL DAILY
Qty: 30 TABLET | Refills: 5 | Status: SHIPPED | OUTPATIENT
Start: 2020-12-17 | End: 2021-10-15 | Stop reason: SDUPTHER

## 2020-12-17 RX ORDER — LOSARTAN POTASSIUM 50 MG/1
50 TABLET ORAL DAILY
Qty: 30 TABLET | Refills: 6 | Status: SHIPPED | OUTPATIENT
Start: 2020-12-17 | End: 2021-10-15 | Stop reason: SDUPTHER

## 2020-12-17 RX ORDER — OMEPRAZOLE 40 MG/1
40 CAPSULE, DELAYED RELEASE ORAL DAILY
Qty: 30 CAPSULE | Refills: 3 | Status: SHIPPED | OUTPATIENT
Start: 2020-12-17 | End: 2021-01-25

## 2020-12-17 RX ORDER — ATORVASTATIN CALCIUM 20 MG/1
20 TABLET, FILM COATED ORAL DAILY
Qty: 30 TABLET | Refills: 3 | Status: SHIPPED | OUTPATIENT
Start: 2020-12-17 | End: 2021-10-15 | Stop reason: SDUPTHER

## 2020-12-24 NOTE — PATIENT INSTRUCTIONS
Calorie Counting for Weight Loss  Calories are units of energy. Your body needs a certain amount of calories from food to keep you going throughout the day. When you eat more calories than your body needs, your body stores the extra calories as fat. When you eat fewer calories than your body needs, your body burns fat to get the energy it needs.  Calorie counting means keeping track of how many calories you eat and drink each day. Calorie counting can be helpful if you need to lose weight. If you make sure to eat fewer calories than your body needs, you should lose weight. Ask your health care provider what a healthy weight is for you.  For calorie counting to work, you will need to eat the right number of calories in a day in order to lose a healthy amount of weight per week. A dietitian can help you determine how many calories you need in a day and will give you suggestions on how to reach your calorie goal.  · A healthy amount of weight to lose per week is usually 1-2 lb (0.5-0.9 kg). This usually means that your daily calorie intake should be reduced by 500-750 calories.  · Eating 1,200 - 1,500 calories per day can help most women lose weight.  · Eating 1,500 - 1,800 calories per day can help most men lose weight.  What is my plan?  My goal is to have __________ calories per day.  If I have this many calories per day, I should lose around __________ pounds per week.  What do I need to know about calorie counting?  In order to meet your daily calorie goal, you will need to:  · Find out how many calories are in each food you would like to eat. Try to do this before you eat.  · Decide how much of the food you plan to eat.  · Write down what you ate and how many calories it had. Doing this is called keeping a food log.  To successfully lose weight, it is important to balance calorie counting with a healthy lifestyle that includes regular activity. Aim for 150 minutes of moderate exercise (such as walking) or 75  minutes of vigorous exercise (such as running) each week.  Where do I find calorie information?    The number of calories in a food can be found on a Nutrition Facts label. If a food does not have a Nutrition Facts label, try to look up the calories online or ask your dietitian for help.  Remember that calories are listed per serving. If you choose to have more than one serving of a food, you will have to multiply the calories per serving by the amount of servings you plan to eat. For example, the label on a package of bread might say that a serving size is 1 slice and that there are 90 calories in a serving. If you eat 1 slice, you will have eaten 90 calories. If you eat 2 slices, you will have eaten 180 calories.  How do I keep a food log?  Immediately after each meal, record the following information in your food log:  · What you ate. Don't forget to include toppings, sauces, and other extras on the food.  · How much you ate. This can be measured in cups, ounces, or number of items.  · How many calories each food and drink had.  · The total number of calories in the meal.  Keep your food log near you, such as in a small notebook in your pocket, or use a mobile anthony or website. Some programs will calculate calories for you and show you how many calories you have left for the day to meet your goal.  What are some calorie counting tips?    · Use your calories on foods and drinks that will fill you up and not leave you hungry:  ? Some examples of foods that fill you up are nuts and nut butters, vegetables, lean proteins, and high-fiber foods like whole grains. High-fiber foods are foods with more than 5 g fiber per serving.  ? Drinks such as sodas, specialty coffee drinks, alcohol, and juices have a lot of calories, yet do not fill you up.  · Eat nutritious foods and avoid empty calories. Empty calories are calories you get from foods or beverages that do not have many vitamins or protein, such as candy, sweets, and  "soda. It is better to have a nutritious high-calorie food (such as an avocado) than a food with few nutrients (such as a bag of chips).  · Know how many calories are in the foods you eat most often. This will help you calculate calorie counts faster.  · Pay attention to calories in drinks. Low-calorie drinks include water and unsweetened drinks.  · Pay attention to nutrition labels for \"low fat\" or \"fat free\" foods. These foods sometimes have the same amount of calories or more calories than the full fat versions. They also often have added sugar, starch, or salt, to make up for flavor that was removed with the fat.  · Find a way of tracking calories that works for you. Get creative. Try different apps or programs if writing down calories does not work for you.  What are some portion control tips?  · Know how many calories are in a serving. This will help you know how many servings of a certain food you can have.  · Use a measuring cup to measure serving sizes. You could also try weighing out portions on a kitchen scale. With time, you will be able to estimate serving sizes for some foods.  · Take some time to put servings of different foods on your favorite plates, bowls, and cups so you know what a serving looks like.  · Try not to eat straight from a bag or box. Doing this can lead to overeating. Put the amount you would like to eat in a cup or on a plate to make sure you are eating the right portion.  · Use smaller plates, glasses, and bowls to prevent overeating.  · Try not to multitask (for example, watch TV or use your computer) while eating. If it is time to eat, sit down at a table and enjoy your food. This will help you to know when you are full. It will also help you to be aware of what you are eating and how much you are eating.  What are tips for following this plan?  Reading food labels  · Check the calorie count compared to the serving size. The serving size may be smaller than what you are used to " "eating.  · Check the source of the calories. Make sure the food you are eating is high in vitamins and protein and low in saturated and trans fats.  Shopping  · Read nutrition labels while you shop. This will help you make healthy decisions before you decide to purchase your food.  · Make a grocery list and stick to it.  Cooking  · Try to cook your favorite foods in a healthier way. For example, try baking instead of frying.  · Use low-fat dairy products.  Meal planning  · Use more fruits and vegetables. Half of your plate should be fruits and vegetables.  · Include lean proteins like poultry and fish.  How do I count calories when eating out?  · Ask for smaller portion sizes.  · Consider sharing an entree and sides instead of getting your own entree.  · If you get your own entree, eat only half. Ask for a box at the beginning of your meal and put the rest of your entree in it so you are not tempted to eat it.  · If calories are listed on the menu, choose the lower calorie options.  · Choose dishes that include vegetables, fruits, whole grains, low-fat dairy products, and lean protein.  · Choose items that are boiled, broiled, grilled, or steamed. Stay away from items that are buttered, battered, fried, or served with cream sauce. Items labeled \"crispy\" are usually fried, unless stated otherwise.  · Choose water, low-fat milk, unsweetened iced tea, or other drinks without added sugar. If you want an alcoholic beverage, choose a lower calorie option such as a glass of wine or light beer.  · Ask for dressings, sauces, and syrups on the side. These are usually high in calories, so you should limit the amount you eat.  · If you want a salad, choose a garden salad and ask for grilled meats. Avoid extra toppings like messina, cheese, or fried items. Ask for the dressing on the side, or ask for olive oil and vinegar or lemon to use as dressing.  · Estimate how many servings of a food you are given. For example, a serving of " cooked rice is ½ cup or about the size of half a baseball. Knowing serving sizes will help you be aware of how much food you are eating at restaurants. The list below tells you how big or small some common portion sizes are based on everyday objects:  ? 1 oz--4 stacked dice.  ? 3 oz--1 deck of cards.  ? 1 tsp--1 die.  ? 1 Tbsp--½ a ping-pong ball.  ? 2 Tbsp--1 ping-pong ball.  ? ½ cup--½ baseball.  ? 1 cup--1 baseball.  Summary  · Calorie counting means keeping track of how many calories you eat and drink each day. If you eat fewer calories than your body needs, you should lose weight.  · A healthy amount of weight to lose per week is usually 1-2 lb (0.5-0.9 kg). This usually means reducing your daily calorie intake by 500-750 calories.  · The number of calories in a food can be found on a Nutrition Facts label. If a food does not have a Nutrition Facts label, try to look up the calories online or ask your dietitian for help.  · Use your calories on foods and drinks that will fill you up, and not on foods and drinks that will leave you hungry.  · Use smaller plates, glasses, and bowls to prevent overeating.  This information is not intended to replace advice given to you by your health care provider. Make sure you discuss any questions you have with your health care provider.  Document Revised: 09/06/2019 Document Reviewed: 11/17/2017  [employer] Patient Education © 2020 [employer]      Exercising to Lose Weight  Exercise is structured, repetitive physical activity to improve fitness and health. Getting regular exercise is important for everyone. It is especially important if you are overweight. Being overweight increases your risk of heart disease, stroke, diabetes, high blood pressure, and several types of cancer. Reducing your calorie intake and exercising can help you lose weight.  Exercise is usually categorized as moderate or vigorous intensity. To lose weight, most people need to do a certain amount of  moderate-intensity or vigorous-intensity exercise each week.  Moderate-intensity exercise    Moderate-intensity exercise is any activity that gets you moving enough to burn at least three times more energy (calories) than if you were sitting.  Examples of moderate exercise include:  · Walking a mile in 15 minutes.  · Doing light yard work.  · Biking at an easy pace.  Most people should get at least 150 minutes (2 hours and 30 minutes) a week of moderate-intensity exercise to maintain their body weight.  Vigorous-intensity exercise  Vigorous-intensity exercise is any activity that gets you moving enough to burn at least six times more calories than if you were sitting. When you exercise at this intensity, you should be working hard enough that you are not able to carry on a conversation.  Examples of vigorous exercise include:  · Running.  · Playing a team sport, such as football, basketball, and soccer.  · Jumping rope.  Most people should get at least 75 minutes (1 hour and 15 minutes) a week of vigorous-intensity exercise to maintain their body weight.  How can exercise affect me?  When you exercise enough to burn more calories than you eat, you lose weight. Exercise also reduces body fat and builds muscle. The more muscle you have, the more calories you burn. Exercise also:  · Improves mood.  · Reduces stress and tension.  · Improves your overall fitness, flexibility, and endurance.  · Increases bone strength.  The amount of exercise you need to lose weight depends on:  · Your age.  · The type of exercise.  · Any health conditions you have.  · Your overall physical ability.  Talk to your health care provider about how much exercise you need and what types of activities are safe for you.  What actions can I take to lose weight?  Nutrition    · Make changes to your diet as told by your health care provider or diet and nutrition specialist (dietitian). This may include:  ? Eating fewer calories.  ? Eating more  protein.  ? Eating less unhealthy fats.  ? Eating a diet that includes fresh fruits and vegetables, whole grains, low-fat dairy products, and lean protein.  ? Avoiding foods with added fat, salt, and sugar.  · Drink plenty of water while you exercise to prevent dehydration or heat stroke.  Activity  · Choose an activity that you enjoy and set realistic goals. Your health care provider can help you make an exercise plan that works for you.  · Exercise at a moderate or vigorous intensity most days of the week.  ? The intensity of exercise may vary from person to person. You can tell how intense a workout is for you by paying attention to your breathing and heartbeat. Most people will notice their breathing and heartbeat get faster with more intense exercise.  · Do resistance training twice each week, such as:  ? Push-ups.  ? Sit-ups.  ? Lifting weights.  ? Using resistance bands.  · Getting short amounts of exercise can be just as helpful as long structured periods of exercise. If you have trouble finding time to exercise, try to include exercise in your daily routine.  ? Get up, stretch, and walk around every 30 minutes throughout the day.  ? Go for a walk during your lunch break.  ? Park your car farther away from your destination.  ? If you take public transportation, get off one stop early and walk the rest of the way.  ? Make phone calls while standing up and walking around.  ? Take the stairs instead of elevators or escalators.  · Wear comfortable clothes and shoes with good support.  · Do not exercise so much that you hurt yourself, feel dizzy, or get very short of breath.  Where to find more information  · [place] Department of Health and Human Services: www.hhs.gov  · Centers for Disease Control and Prevention (CDC): www.cdc.gov  Contact a health care provider:  · Before starting a new exercise program.  · If you have questions or concerns about your weight.  · If you have a medical problem that keeps you from  exercising.  Get help right away if you have any of the following while exercising:  · Injury.  · Dizziness.  · Difficulty breathing or shortness of breath that does not go away when you stop exercising.  · Chest pain.  · Rapid heartbeat.  Summary  · Being overweight increases your risk of heart disease, stroke, diabetes, high blood pressure, and several types of cancer.  · Losing weight happens when you burn more calories than you eat.  · Reducing the amount of calories you eat in addition to getting regular moderate or vigorous exercise each week helps you lose weight.  This information is not intended to replace advice given to you by your health care provider. Make sure you discuss any questions you have with your health care provider.  Document Revised: 12/31/2018 Document Reviewed: 12/31/2018  [employer] Patient Education © 2020 [employer]

## 2020-12-24 NOTE — PROGRESS NOTES
Subjective   Goldie Kraus is a 60 y.o. female.     Nohemy Kraus 60 comes in today with main complaint of arthritis multiple sites.  She has in her hands spine knees shoulders.  At present she is not taking anything for symptoms.  She also has history of hypertension hyper lipidemia.  New problem is that she has a mole on her right ankle that has changed.  She would like to have it removed.    Arthritis  Presents for follow-up visit. She complains of pain and stiffness. The symptoms have been worsening. Affected location: multiple sites, see above. Her pain is at a severity of 4/10. Pertinent negatives include no diarrhea, dry eyes, dry mouth, dysuria, fatigue, fever, pain at night, pain while resting, rash, Raynaud's syndrome, uveitis or weight loss. Compliance with total regimen is %. Compliance with medications is %.   Hypertension  This is a chronic problem. The current episode started more than 1 year ago. The problem is unchanged. The problem is controlled. Pertinent negatives include no anxiety, blurred vision, chest pain, headaches, malaise/fatigue, neck pain, orthopnea, palpitations, peripheral edema, PND, shortness of breath or sweats. Agents associated with hypertension include NSAIDs. Risk factors for coronary artery disease include dyslipidemia, post-menopausal state, obesity, sedentary lifestyle and smoking/tobacco exposure. Past treatments include angiotensin blockers. Current antihypertension treatment includes angiotensin blockers. The current treatment provides significant improvement. There are no compliance problems.  There is no history of angina, kidney disease, CAD/MI, CVA, heart failure, left ventricular hypertrophy, PVD or retinopathy. There is no history of chronic renal disease.   Hyperlipidemia  This is a chronic problem. The current episode started more than 1 year ago. The problem is controlled. Recent lipid tests were reviewed and are normal. Exacerbating diseases  include obesity and nephrotic syndrome. She has no history of chronic renal disease, diabetes, hypothyroidism or liver disease. Factors aggravating her hyperlipidemia include smoking. Pertinent negatives include no chest pain, focal sensory loss, focal weakness, leg pain, myalgias or shortness of breath. Current antihyperlipidemic treatment includes statins. The current treatment provides significant improvement of lipids. There are no compliance problems.  Risk factors for coronary artery disease include dyslipidemia, hypertension, obesity, post-menopausal and a sedentary lifestyle.        The following portions of the patient's history were reviewed and updated as appropriate: allergies, current medications, past family history, past medical history, past social history, past surgical history and problem list.    Review of Systems   Constitutional: Negative.  Negative for fatigue, fever, malaise/fatigue and unexpected weight loss.   HENT: Negative.    Eyes: Negative.  Negative for blurred vision.   Respiratory: Negative.  Negative for shortness of breath.    Cardiovascular: Negative.  Negative for chest pain, palpitations, orthopnea and PND.   Gastrointestinal: Negative.  Negative for diarrhea.   Endocrine: Negative.    Genitourinary: Negative.  Negative for dysuria.   Musculoskeletal: Positive for arthritis and stiffness. Negative for myalgias and neck pain.   Skin: Negative.  Negative for rash.   Allergic/Immunologic: Negative.    Neurological: Negative.  Negative for focal weakness.   Hematological: Negative.    Psychiatric/Behavioral: Negative.        Objective   Physical Exam  Vitals signs and nursing note reviewed.   Constitutional:       General: She is not in acute distress.     Appearance: She is well-developed.   HENT:      Head: Normocephalic and atraumatic.      Right Ear: External ear normal.      Left Ear: External ear normal.      Nose: Nose normal.      Mouth/Throat:      Pharynx: No oropharyngeal  exudate.   Eyes:      Pupils: Pupils are equal, round, and reactive to light.   Neck:      Musculoskeletal: Normal range of motion and neck supple.      Thyroid: No thyromegaly.   Cardiovascular:      Rate and Rhythm: Normal rate and regular rhythm.      Heart sounds: Normal heart sounds. No murmur. No friction rub.   Pulmonary:      Effort: Pulmonary effort is normal. No respiratory distress.      Breath sounds: Normal breath sounds. No wheezing or rales.   Abdominal:      Palpations: Abdomen is soft.   Musculoskeletal: Normal range of motion.      Right knee: She exhibits normal range of motion. Tenderness found.      Left knee: She exhibits normal range of motion. Tenderness found.      Right hand: She exhibits tenderness. She exhibits normal range of motion.      Left hand: She exhibits tenderness. She exhibits normal range of motion.   Skin:     General: Skin is warm and dry.      Comments: Crusty lesion on her right ankle this past signs of dime.  There is some open areas towards the center that are excoriated in appearance.    Neurological:      Mental Status: She is alert and oriented to person, place, and time.   Psychiatric:         Thought Content: Thought content normal.           Assessment/Plan   Diagnoses and all orders for this visit:    1. Arthritis (Primary)  -     meloxicam (Mobic) 15 MG tablet; Take 1 tablet by mouth Daily.  Dispense: 30 tablet; Refill: 5    2. Essential hypertension  -     losartan (COZAAR) 50 MG tablet; Take 1 tablet by mouth Daily.  Dispense: 30 tablet; Refill: 6  -     Comprehensive metabolic panel    3. Mixed hyperlipidemia  -     atorvastatin (LIPITOR) 20 MG tablet; Take 1 tablet by mouth Daily.  Dispense: 30 tablet; Refill: 3  -     Lipid Panel    4. Gastroesophageal reflux disease  -     omeprazole (priLOSEC) 40 MG capsule; Take 1 capsule by mouth Daily.  Dispense: 30 capsule; Refill: 3    5. Encounter for screening mammogram for malignant neoplasm of breast  -     Mammo  Screening Bilateral With CAD; Future    6. Skin lesion  -     Ambulatory Referral to General Surgery    My is noted to be 33.7 which is considered obese.  Diet and exercise information will be provided the patient.

## 2021-01-06 ENCOUNTER — TELEPHONE (OUTPATIENT)
Dept: FAMILY MEDICINE CLINIC | Facility: CLINIC | Age: 61
End: 2021-01-06

## 2021-01-20 DIAGNOSIS — K21.9 GASTROESOPHAGEAL REFLUX DISEASE: ICD-10-CM

## 2021-01-25 RX ORDER — OMEPRAZOLE 40 MG/1
40 CAPSULE, DELAYED RELEASE ORAL DAILY
Qty: 30 CAPSULE | Refills: 3 | Status: SHIPPED | OUTPATIENT
Start: 2021-01-25 | End: 2021-10-15 | Stop reason: SDUPTHER

## 2021-02-05 ENCOUNTER — TELEPHONE (OUTPATIENT)
Dept: FAMILY MEDICINE CLINIC | Facility: CLINIC | Age: 61
End: 2021-02-05

## 2021-03-17 ENCOUNTER — TELEPHONE (OUTPATIENT)
Dept: FAMILY MEDICINE CLINIC | Facility: CLINIC | Age: 61
End: 2021-03-17

## 2021-03-18 ENCOUNTER — TELEPHONE (OUTPATIENT)
Dept: FAMILY MEDICINE CLINIC | Facility: CLINIC | Age: 61
End: 2021-03-18

## 2021-07-14 NOTE — TELEPHONE ENCOUNTER
Assessment  1  Lumbar radiculopathy  -     Case request operating room: BLOCK / INJECTION EPIDURAL STEROID right S1 TFESI; Standing  -     Case request operating room: BLOCK / INJECTION EPIDURAL STEROID right S1 TFESI  -     MRI lumbar spine wo contrast; Future; Expected date: 07/14/2021    2  Morbid obesity ([address] )      Right-sided lumbar radicular pain in L5 and S1 dermatomal distribution  Moderate posterolateral disc herniation at L5-S1 in addition to spondyloarthropathy contributing to moderate transforaminal stenosis here  Reasonable to repeat MRI imaging as last 1 was done in 2018 to evaluate her new more S1 radicular symptoms  Interlaminar epidural steroid injection considerably help with symptomatology noted below  In the interim patient had right knee total arthroplasty which is considerably help with his right sided knee pain:    pain consistent with thoracic radicular symptoms secondary to T10-T11 disc herniation which from 2018 MRI thoracic spine does not contribute to significant cord compression  Radiating and radicular pain in T10 and T11 dermatomal distribution  Pain is along lower thoracic T10 and T11 dermatomes, particularly with palpation of lower thoracic ribs and posterolateral distribution  Interestingly his significant and advanced lumbar facet arthropathy which is not a  primary source of his pain at this time  He previously had epidural steroid injections in the past with Dr Iglesia Billings with noted relief  For radicular pain  Reasonable to reobtain imaging, and pursue multimodal pain therapy plan as noted below  Plan  - MRI lumbar spine noncontrast  - Right L5 and S1 transforaminal epidural steroid injection; follow-up in 2 weeks after  -Lyrica 75 mg t i d   Discontinued secondary to side effects  Prescribed Topamax 50 mg b i d ; counseled regarding sedative effects of taking this medication and provided up titration calendar    Counseled not to take medication while driving or No answer lm    operating heavy machinery/using stairs  -continue other analgesics as prescribed previously well provider's  Counseled extensively as noted below regarding the risks of opioid medications in combination with Lyrica  -physical therapy for  Lumbar facet arthropathy, lumbar radiculopathy    There are risks associated with opioid medications, including dependence, addiction and tolerance  The patient understands and agrees to use these medications only as prescribed  Potential side effects of the medications include, but are not limited to, constipation, drowsiness, addiction, impaired judgment and risk of fatal overdose if not taken as prescribed  The patient was warned against driving while taking sedation medications  Sharing medications is a felony  At this point in time, the patient is showing no signs of addiction, abuse, diversion or suicidal ideation  A urine drug screen was collected at today's office visit as part of our medication management protocol  The point of care testing results were appropriate for what was being prescribed  The specimen will be sent for confirmatory testing  The drug screen is medically necessary because the patient is either dependent on opioid medication or is being considered for opioid medication therapy and the results could impact ongoing or future treatment  The drug screen is to evaluate for the presences or absence of prescribed, non-prescribed, and/or illicit drugs/substances  [place] Prescription Drug Monitoring Program report was reviewed and was appropriate     Complete risks and benefits including bleeding, infection, tissue reaction, nerve injury and allergic reaction were discussed  The approach was demonstrated using models and literature was provided  Verbal and written consent was obtained  My impressions and treatment recommendations were discussed in detail with the patient who verbalized understanding and had no further questions    Discharge instructions were provided  I personally saw and examined the patient and I agree with the above discussed plan of care  New Medications Ordered This Visit   Medications    cephalexin (KEFLEX) 500 mg capsule     Sig: Take 500 mg by mouth 3 (three) times a day    LORazepam (ATIVAN) 0 5 mg tablet     Sig: Take 0 5 mg by mouth daily as needed    traZODone (DESYREL) 100 mg tablet     Sig: TAKE 1 TABLET BY MOUTH EVERY DAY EVERY NIGHT AT BEDTIME       History of Present Illness    Right-sided lumbar radicular pain in L5 and S1 dermatomal distribution  Moderate posterolateral disc herniation at L5-S1 in addition to spondyloarthropathy contributing to moderate transforaminal stenosis here  Reasonable to repeat MRI imaging as last 1 was done in 2018 to evaluate her new more S1 radicular symptoms  Interlaminar epidural steroid injection considerably help with symptomatology noted belowIn the interim patient had right knee total arthroplasty which is considerably help with his right sided knee pain:    Sherman Lam is a 62 y o  male with a past medical history of  Non-insulin controlled type 2 diabetes, obesity, chronic low back pain described primarily as arthritic in nature  He describes 8/10 low back pain that is worse in the mornings and worse at the end of the day  The pain is characterized by achy, nagging, indolent, crampy, stabbing pain in his axial low back  The patient describes that the pain is worse with standing for long periods of time on hard surfaces as well as with walking  The patient is a very active individual and feels as though this pain compromises his participation with independent activities of daily living  He ambulates with a walker  The pain can be debilitating at times and contribute to significant disability, compromising overall activity and independent activities of daily living  He has tried physical therapy with limited relief of symptoms    Medications  he is currently on include   Celebrex 200 mg per day, Percocet 5-325 mg Q 6 hours p r n  pain  He takes 200 mg of gabapentin per day and was recently started on Robaxin 500 mg t i d  for back spasms  He additionally has pain radiating across his ribs bilaterally in the T10 and T11 dermatomal distribution  He demonstrates good strength and denies any weakness numbness or paresthesias  The patient denies any bowel or bladder dysfunction as well  I have personally reviewed and/or updated the patient's past medical history, past surgical history, family history, social history, current medications, allergies, and vital signs today  Review of Systems   Constitutional: Positive for activity change  HENT: Negative  Eyes: Negative  Respiratory: Negative  Cardiovascular: Negative  Gastrointestinal: Negative  Endocrine: Negative  Genitourinary: Negative  Musculoskeletal: Positive for arthralgias, back pain, gait problem and myalgias  Skin: Negative  Allergic/Immunologic: Negative  Neurological: Positive for numbness  Negative for weakness  Hematological: Negative  Psychiatric/Behavioral: Negative  All other systems reviewed and are negative  Patient Active Problem List   Diagnosis    Herniation of intervertebral disc of thoracic region    Diabetic peripheral neuropathy (HCC)    Morbid obesity ([address] )       Past Medical History:   Diagnosis Date    Anxiety     Arthritis     Chronic pain disorder     mid back region    Colon polyp     CPAP (continuous positive airway pressure) dependence     Pt uses nightly    Diabetes mellitus ([address] )     Diverticulosis     Hyperlipidemia     Hypertension     Obesity     Sleep apnea     Spinal stenosis        Past Surgical History:   Procedure Laterality Date    ACHILLES TENDON REPAIR      Pt had a rupture in right and left 2 years apart    COLONOSCOPY      EPIDURAL BLOCK INJECTION      Pt had in lumbar region      EPIDURAL BLOCK INJECTION N/A 4/6/2021    Procedure: T-11-T-12 INTERLAMINAR THORACIC EPIDURAL STEROID INJECTION;  Surgeon: Chapito Lang MD;  Location: [facility];  Service: Pain Management     FOOT SURGERY Left     Left great toe- had a hammertoe   JOINT REPLACEMENT Left     Total hip    JOINT REPLACEMENT Right     Total hip   KNEE ARTHROSCOPY Bilateral     TOTAL KNEE ARTHROPLASTY Left        Family History   Problem Relation Age of Onset    Arthritis Mother     Hypertension Father        Social History     Occupational History    Not on file   Tobacco Use    Smoking status: Never Smoker    Smokeless tobacco: Current User     Types: Snuff   Vaping Use    Vaping Use: Never used   Substance and Sexual Activity    Alcohol use:  Yes    Drug use: No    Sexual activity: Not on file     Comment: did not ask        Current Outpatient Medications on File Prior to Visit   Medication Sig    acetaminophen (TYLENOL) 500 mg tablet Take 1,000 mg by mouth every 6 (six) hours as needed for mild pain    amoxicillin (AMOXIL) 500 mg capsule TAKE 4 CAPSULES BY MOUTH 1 HOUR PRIOR TO DENTAL PROCEDURES    atorvastatin (LIPITOR) 20 mg tablet Take 20 mg by mouth daily    celecoxib (CeleBREX) 200 mg capsule Take 200 mg by mouth daily    Empagliflozin (Jardiance) 25 MG TABS Take 25 mg by mouth daily    ERGOCALCIFEROL PO Take 50,000 Units by mouth 2 (two) times a week     famotidine (PEPCID) 20 mg tablet Take by mouth    LORazepam (ATIVAN) 1 mg tablet Take 1 mg by mouth every 6 (six) hours as needed     mupirocin (BACTROBAN) 2 % ointment     Semaglutide,0 25 or 0 5MG/DOS, (Ozempic, 0 25 or 0 5 MG/DOSE,) 2 MG/1 5ML SOPN Inject 0 5 mg under the skin once a week     sertraline (ZOLOFT) 50 mg tablet Take by mouth    valsartan (DIOVAN) 80 mg tablet Take 80 mg by mouth daily     [DISCONTINUED] aspirin (ECOTRIN LOW STRENGTH) 81 mg EC tablet Take 81 mg by mouth daily    [DISCONTINUED] cefadroxil (DURICEF) 500 mg capsule TAKE 1 CAPSULE BY MOUTH TWICE A DAY FOR 7 DAYS    [DISCONTINUED] clonazePAM (KlonoPIN) 0 5 mg tablet Take 0 5 mg by mouth 2 (two) times a day as needed    [DISCONTINUED] HYDROcodone-acetaminophen (NORCO) 5-325 mg per tablet Take 1-2 tablets by mouth every 6 (six) hours as needed    cephalexin (KEFLEX) 500 mg capsule Take 500 mg by mouth 3 (three) times a day    LORazepam (ATIVAN) 0 5 mg tablet Take 0 5 mg by mouth daily as needed    traZODone (DESYREL) 100 mg tablet TAKE 1 TABLET BY MOUTH EVERY DAY EVERY NIGHT AT BEDTIME    [DISCONTINUED] diltiazem (TIAZAC) 180 MG 24 hr capsule Take 180 mg by mouth    [DISCONTINUED] hydrOXYzine HCL (ATARAX) 50 mg tablet Take 50 mg by mouth daily at bedtime Pt states he takes 100 mg at hs    [DISCONTINUED] oxyCODONE-acetaminophen (PERCOCET) 5-325 mg per tablet Take 1 tablet by mouth every 6 (six) hours as needed    [DISCONTINUED] PARoxetine (PAXIL) 20 mg tablet Take 20 mg by mouth daily    [DISCONTINUED] Semaglutide,0 25 or 0 5MG/DOS, (Ozempic, 0 25 or 0 5 MG/DOSE,) 2 MG/1 5ML SOPN Inject under the skin    [DISCONTINUED] Semaglutide,0 25 or 0 5MG/DOS, (Ozempic, 0 25 or 0 5 MG/DOSE,) 2 MG/1 5ML SOPN Inject under the skin once a week on    [DISCONTINUED] topiramate (TOPAMAX) 50 MG tablet TAKE 1 TABLET BY MOUTH TWICE A DAY    [DISCONTINUED] traMADol (ULTRAM) 50 mg tablet TAKE 1 TO 2 TABLETS EVERY 6 HOURS AS NEEDED     No current facility-administered medications on file prior to visit  Allergies   Allergen Reactions    Paxil [Paroxetine] GI Intolerance         Physical Exam    /76   Pulse 82   Temp 98 °F (36 7 °C)   Resp 20   Ht 6' 3" (1 905 m)   Wt (!) 155 kg (341 lb 3 2 oz)   BMI 42 65 kg/m²     Constitutional: normal, well developed, well nourished, alert, in no distress and non-toxic and no overt pain behavior   and obese  Eyes: anicteric  HEENT: grossly intact  Neck: supple, symmetric, trachea midline and no masses   Pulmonary:even and unlabored  Cardiovascular:No edema or pitting edema present  Skin:Normal without rashes or lesions and well hydrated  Psychiatric:Mood and affect appropriate  Neurologic:Cranial Nerves II-XII grossly intact Sensation grossly intact; no clonus negative diallo's  Reflexes 2+ and brisk  SLR  Weakly positive right-sided we  Musculoskeletal: hunched gait  Unable to perform normal heel toe and tip toe walking  Slight weakness with right lower extremity strong plantar flexion; 5/5 strength with active range of motion movements in all other extremities bilaterally  mild pain with lumbar facet loading bilaterally and with lateral spine rotation  mild ttp over lumbar paraspinal muscles  Negative jahaira's test, negative gaenslen's negative SIJ loading bilaterally  Tenderness to palpation over inferior rib borders of T10-T11 posterolateral ribs bilaterally,  Eliciting radicular pain in aformentioned dermatomal distributions  Imaging    Impression: Hypertrophic facet arthrosis with fluid in bilateral facet joints at  L3-L4  Consider active facet inflammation or from degeneration or infection,  infection must be excluded clinically  There is no focal fluid collection  A  component of epidural lipomatosis in the lower lumbar spine  Disc osteophyte  complex resulting in at least moderate left foraminal narrowing at L5-S1 abuts  the exiting left L5 nerve root, correlate clinically for left L5 distribution  symptoms  [id number]   Result Narrative   History: Back pain, evaluate for cauda equina syndrome  Technique: MRI of the lumbar spine was performed with sagittal T1, sagittal T2,  sagittal STIR, axial T1 and T2-weighted images  Following contrast  administration, sagittal and axial T1-weighted images were obtained  18 5 cc  Gadavist injected intravenously  Comparison: None  Findings: For the purposes of this dictation, the lumbar vertebral bodies are  labeled from caudal to cranial direction   The first vertebra with lumbar  morphology is labeled as L5  There is poor signal-to-noise ratio on the study secondary to patient's body  habitus  There is abundant fat within the spinal canal which causes mild circumferential  impression on the thecal sac and its contents  The lumbar vertebrae are normal  in alignment  The vertebral body heights are maintained  There is no acute  compression deformity or spondylolisthesis  There is mild congestion of the epidural venous plexus on the contrast-enhanced  study  There is enhancement in the facet joints bilaterally at L3-L4  This is  indicative of facet inflammation either from degenerative disc disease or  infection  Infection must excluded clinically  There is no abnormal focal fluid  collection  There is asymmetric fatty atrophy of the left psoas group of  muscles  At L1-L2, there is facet arthrosis and a disc bulge without significant spinal  stenosis or foraminal narrowing  At L2-L3, there is bilateral facet arthrosis and disc bulge causing mild spinal  stenosis and mild right foraminal narrowing  At L3-L4, there is hypertrophic facet arthrosis with fluid in bilateral facet  joints  There is pathologic enhancement or demonstration of contrast  There is  mild enhancement in the soft tissues on the right facet joint  There is no focal  fluid collection  There is mild spinal stenosis and mild to moderate right  foraminal narrowing, mild left foraminal narrowing  At L4-L5, there is a disc bulge, facet arthrosis causing mild spinal stenosis  and mild to moderate left foraminal narrowing  At L5-S1, there is a disc bulge with a left subarticular and foraminal disc  osteophyte complex, hypertrophic facet arthrosis causing at least moderate right  foraminal narrowing  The disc osteophyte complex abuts the exiting left L5 nerve  root, correlate clinically for left L5 distribution symptoms  Impression:  1  Technically suboptimal study  2  Marked spondylosis    3  At T10-T11 there is spinal stenosis however there is no marlyn spinal cord  compression  Workstation:[id number]   Result Narrative   Exam - thoracic spine mri scan     History: Bilateral leg weakness  Technique: Using a surface coil sagittal and axial T1-weighted and T2-weighted  scans were obtained of the thoracic spine  Findings: Image detail is suboptimal secondary to patient's obesity  Thoracic spinal cord is normal in size and configuration and MRI signal  intensity  There are no intramedullary lesions  Vertebral bodies are in anatomic alignment  There are degenerative changes in endplates and facet joints throughout the  thoracic spine  There is central spinal stenosis at T10-T11  There is no marlyn spinal cord  compression however  Other Result Information   Interface, Rad Results In - 08/07/2018  7:34 PM EDT  Formatting of this note might be different from the original   Exam - thoracic spine mri scan     History: Bilateral leg weakness  Technique: Using a surface coil sagittal and axial T1-weighted and T2-weighted  scans were obtained of the thoracic spine  Findings: Image detail is suboptimal secondary to patient's obesity  Thoracic spinal cord is normal in size and configuration and MRI signal  intensity  There are no intramedullary lesions  Vertebral bodies are in anatomic alignment  There are degenerative changes in endplates and facet joints throughout the  thoracic spine  There is central spinal stenosis at T10-T11  There is no marlyn spinal cord  compression however  IMPRESSION:  Impression:  1  Technically suboptimal study  2  Marked spondylosis  3  At T10-T11 there is spinal stenosis however there is no marlyn spinal cord  compression

## 2021-08-09 PROBLEM — Z72.0 TOBACCO USER: Status: ACTIVE | Noted: 2021-08-09

## 2021-08-09 PROBLEM — E78.2 MIXED HYPERLIPIDEMIA: Status: ACTIVE | Noted: 2021-08-09

## 2021-09-21 ENCOUNTER — TELEPHONE (OUTPATIENT)
Dept: FAMILY MEDICINE CLINIC | Facility: CLINIC | Age: 61
End: 2021-09-21

## 2021-09-21 NOTE — TELEPHONE ENCOUNTER
Called to schedule an appt for a med refill that was requested by Esperanza  No answer left voicemaile
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
## 2021-10-15 ENCOUNTER — OFFICE VISIT (OUTPATIENT)
Dept: FAMILY MEDICINE CLINIC | Facility: CLINIC | Age: 61
End: 2021-10-15

## 2021-10-15 VITALS
DIASTOLIC BLOOD PRESSURE: 80 MMHG | HEIGHT: 66 IN | OXYGEN SATURATION: 98 % | SYSTOLIC BLOOD PRESSURE: 144 MMHG | TEMPERATURE: 97.5 F | HEART RATE: 98 BPM | BODY MASS INDEX: 32.62 KG/M2 | WEIGHT: 203 LBS

## 2021-10-15 DIAGNOSIS — Z13.29 ENCOUNTER FOR SCREENING FOR ENDOCRINE DISORDER: ICD-10-CM

## 2021-10-15 DIAGNOSIS — Z12.31 SCREENING MAMMOGRAM, ENCOUNTER FOR: ICD-10-CM

## 2021-10-15 DIAGNOSIS — Z72.0 TOBACCO USER: ICD-10-CM

## 2021-10-15 DIAGNOSIS — Z12.11 ENCOUNTER FOR SCREENING COLONOSCOPY: ICD-10-CM

## 2021-10-15 DIAGNOSIS — E78.2 MIXED HYPERLIPIDEMIA: ICD-10-CM

## 2021-10-15 DIAGNOSIS — K21.9 GASTROESOPHAGEAL REFLUX DISEASE, UNSPECIFIED WHETHER ESOPHAGITIS PRESENT: ICD-10-CM

## 2021-10-15 DIAGNOSIS — E66.9 CLASS 1 OBESITY WITH SERIOUS COMORBIDITY AND BODY MASS INDEX (BMI) OF 32.0 TO 32.9 IN ADULT, UNSPECIFIED OBESITY TYPE: ICD-10-CM

## 2021-10-15 DIAGNOSIS — M19.90 ARTHRITIS: ICD-10-CM

## 2021-10-15 DIAGNOSIS — Z01.419 ENCOUNTER FOR CERVICAL PAP SMEAR WITH PELVIC EXAM: Primary | ICD-10-CM

## 2021-10-15 DIAGNOSIS — I10 ESSENTIAL HYPERTENSION: ICD-10-CM

## 2021-10-15 DIAGNOSIS — K21.9 GASTROESOPHAGEAL REFLUX DISEASE: ICD-10-CM

## 2021-10-15 PROCEDURE — 99215 OFFICE O/P EST HI 40 MIN: CPT | Performed by: FAMILY MEDICINE

## 2021-10-15 RX ORDER — LOSARTAN POTASSIUM 50 MG/1
50 TABLET ORAL DAILY
Qty: 30 TABLET | Refills: 6 | Status: SHIPPED | OUTPATIENT
Start: 2021-10-15 | End: 2022-11-14

## 2021-10-15 RX ORDER — METHOCARBAMOL 500 MG/1
TABLET, FILM COATED ORAL
COMMUNITY
Start: 2021-10-04 | End: 2021-11-17

## 2021-10-15 RX ORDER — OMEPRAZOLE 40 MG/1
40 CAPSULE, DELAYED RELEASE ORAL DAILY
Qty: 30 CAPSULE | Refills: 3 | Status: SHIPPED | OUTPATIENT
Start: 2021-10-15 | End: 2022-02-23

## 2021-10-15 RX ORDER — ATORVASTATIN CALCIUM 20 MG/1
20 TABLET, FILM COATED ORAL NIGHTLY
Qty: 30 TABLET | Refills: 3 | Status: SHIPPED | OUTPATIENT
Start: 2021-10-15 | End: 2021-10-26 | Stop reason: DRUGHIGH

## 2021-10-15 RX ORDER — MELOXICAM 15 MG/1
15 TABLET ORAL DAILY
Qty: 30 TABLET | Refills: 5 | Status: SHIPPED | OUTPATIENT
Start: 2021-10-15 | End: 2023-02-22 | Stop reason: SDUPTHER

## 2021-10-15 NOTE — PATIENT INSTRUCTIONS
Recheck in 4 weeks    Get labwork    Start checking blood pressure athome and bring on next visit    Will refer to get colon cancer screening and EGD    Will get mammogram    Will refer to OB for pap smear       Duloxetine delayed-release capsules  What is this medicine?  DULOXETINE (doo LOX e teen) is used to treat depression, anxiety, and different types of chronic pain.  This medicine may be used for other purposes; ask your health care provider or pharmacist if you have questions.  COMMON BRAND NAME(S): Cymbalta, Mumtaz, Bindustuartjuan  What should I tell my health care provider before I take this medicine?  They need to know if you have any of these conditions:  · bipolar disorder  · glaucoma  · high blood pressure  · kidney disease  · liver disease  · seizures  · suicidal thoughts, plans or attempt; a previous suicide attempt by you or a family member  · take medicines that treat or prevent blood clots  · taken medicines called MAOIs like Carbex, Eldepryl, Marplan, Nardil, and Parnate within 14 days  · trouble passing urine  · an unusual reaction to duloxetine, other medicines, foods, dyes, or preservatives  · pregnant or trying to get pregnant  · breast-feeding  How should I use this medicine?  Take this medicine by mouth with a glass of water. Follow the directions on the prescription label. Do not crush, cut or chew some capsules of this medicine. Some capsules may be opened and sprinkled on applesauce. Check with your doctor or pharmacist if you are not sure. You can take this medicine with or without food. Take your medicine at regular intervals. Do not take your medicine more often than directed. Do not stop taking this medicine suddenly except upon the advice of your doctor. Stopping this medicine too quickly may cause serious side effects or your condition may worsen.  A special MedGuide will be given to you by the pharmacist with each prescription and refill. Be sure to read this information carefully  each time.  Talk to your pediatrician regarding the use of this medicine in children. While this drug may be prescribed for children as young as 7 years of age for selected conditions, precautions do apply.  Overdosage: If you think you have taken too much of this medicine contact a poison control center or emergency room at once.  NOTE: This medicine is only for you. Do not share this medicine with others.  What if I miss a dose?  If you miss a dose, take it as soon as you can. If it is almost time for your next dose, take only that dose. Do not take double or extra doses.  What may interact with this medicine?  Do not take this medicine with any of the following medications:  · desvenlafaxine  · levomilnacipran  · linezolid  · MAOIs like Carbex, Eldepryl, Emsam, Marplan, Nardil, and Parnate  · methylene blue (injected into a vein)  · milnacipran  · safinamide  · thioridazine  · venlafaxine  · viloxazine  This medicine may also interact with the following medications:  · alcohol  · amphetamines  · aspirin and aspirin-like medicines  · certain antibiotics like ciprofloxacin and enoxacin  · certain medicines for blood pressure, heart disease, irregular heart beat  · certain medicines for depression, anxiety, or psychotic disturbances  · certain medicines for migraine headache like almotriptan, eletriptan, frovatriptan, naratriptan, rizatriptan, sumatriptan, zolmitriptan  · certain medicines that treat or prevent blood clots like warfarin, enoxaparin, and dalteparin  · cimetidine  · fentanyl  · lithium  · NSAIDS, medicines for pain and inflammation, like ibuprofen or naproxen  · phentermine  · procarbazine  · rasagiline  · sibutramine  · Jinny's wort  · theophylline  · tramadol  · tryptophan  This list may not describe all possible interactions. Give your health care provider a list of all the medicines, herbs, non-prescription drugs, or dietary supplements you use. Also tell them if you smoke, drink alcohol, or  use illegal drugs. Some items may interact with your medicine.  What should I watch for while using this medicine?  Tell your doctor if your symptoms do not get better or if they get worse. Visit your doctor or healthcare provider for regular checks on your progress. Because it may take several weeks to see the full effects of this medicine, it is important to continue your treatment as prescribed by your doctor.  This medicine may cause serious skin reactions. They can happen weeks to months after starting the medicine. Contact your healthcare provider right away if you notice fevers or flu-like symptoms with a rash. The rash may be red or purple and then turn into blisters or peeling of the skin. Or, you might notice a red rash with swelling of the face, lips, or lymph nodes in your neck or under your arms.  Patients and their families should watch out for new or worsening thoughts of suicide or depression. Also watch out for sudden changes in feelings such as feeling anxious, agitated, panicky, irritable, hostile, aggressive, impulsive, severely restless, overly excited and hyperactive, or not being able to sleep. If this happens, especially at the beginning of treatment or after a change in dose, call your healthcare provider.  You may get drowsy or dizzy. Do not drive, use machinery, or do anything that needs mental alertness until you know how this medicine affects you. Do not stand or sit up quickly, especially if you are an older patient. This reduces the risk of dizzy or fainting spells. Alcohol may interfere with the effect of this medicine. Avoid alcoholic drinks.  This medicine can cause an increase in blood pressure. This medicine can also cause a sudden drop in your blood pressure, which may make you feel faint and increase the chance of a fall. These effects are most common when you first start the medicine or when the dose is increased, or during use of other medicines that can cause a sudden drop in  blood pressure. Check with your doctor for instructions on monitoring your blood pressure while taking this medicine.  Your mouth may get dry. Chewing sugarless gum or sucking hard candy, and drinking plenty of water, may help. Contact your doctor if the problem does not go away or is severe.  What side effects may I notice from receiving this medicine?  Side effects that you should report to your doctor or health care professional as soon as possible:  · allergic reactions like skin rash, itching or hives, swelling of the face, lips, or tongue  · anxious  · breathing problems  · confusion  · changes in vision  · chest pain  · confusion  · elevated mood, decreased need for sleep, racing thoughts, impulsive behavior  · eye pain  · fast, irregular heartbeat  · feeling faint or lightheaded, falls  · feeling agitated, angry, or irritable  · hallucination, loss of contact with reality  · high blood pressure  · loss of balance or coordination  · palpitations  · redness, blistering, peeling or loosening of the skin, including inside the mouth  · restlessness, pacing, inability to keep still  · seizures  · stiff muscles  · suicidal thoughts or other mood changes  · trouble passing urine or change in the amount of urine  · trouble sleeping  · unusual bleeding or bruising  · unusually weak or tired  · vomiting  · yellowing of the eyes or skin  Side effects that usually do not require medical attention (report to your doctor or health care professional if they continue or are bothersome):  · change in sex drive or performance  · change in appetite or weight  · constipation  · dizziness  · dry mouth  · headache  · increased sweating  · nausea  · tired  This list may not describe all possible side effects. Call your doctor for medical advice about side effects. You may report side effects to FDA at 6-687-UTR-4397.  Where should I keep my medicine?  Keep out of the reach of children and pets.  Store at room temperature between 15  and 30 degrees C (59 to 86 degrees F). Get rid of any unused medicine after the expiration date.  To get rid of medicines that are no longer needed or have :  · Take the medicine to a medicine take-back program. Check with your pharmacy or law enforcement to find a location.  · If you cannot return the medicine, check the label or package insert to see if the medicine should be thrown out in the garbage or flushed down the toilet. If you are not sure, ask your health care provider. If it is safe to put it in the trash, take the medicine out of the container. Mix the medicine with cat litter, dirt, coffee grounds, or other unwanted substance. Seal the mixture in a bag or container. Put it in the trash.  NOTE: This sheet is a summary. It may not cover all possible information. If you have questions about this medicine, talk to your doctor, pharmacist, or health care provider.  ©  [employer] (2021 13:14:18)

## 2021-10-20 ENCOUNTER — LAB (OUTPATIENT)
Dept: LAB | Facility: HOSPITAL | Age: 61
End: 2021-10-20

## 2021-10-20 DIAGNOSIS — M19.90 ARTHRITIS: ICD-10-CM

## 2021-10-20 DIAGNOSIS — Z13.29 ENCOUNTER FOR SCREENING FOR ENDOCRINE DISORDER: ICD-10-CM

## 2021-10-20 DIAGNOSIS — E78.2 MIXED HYPERLIPIDEMIA: ICD-10-CM

## 2021-10-20 DIAGNOSIS — I10 ESSENTIAL HYPERTENSION: ICD-10-CM

## 2021-10-20 DIAGNOSIS — K21.9 GASTROESOPHAGEAL REFLUX DISEASE: ICD-10-CM

## 2021-10-20 DIAGNOSIS — E66.9 CLASS 1 OBESITY WITH SERIOUS COMORBIDITY AND BODY MASS INDEX (BMI) OF 32.0 TO 32.9 IN ADULT, UNSPECIFIED OBESITY TYPE: ICD-10-CM

## 2021-10-20 LAB
25(OH)D3 SERPL-MCNC: 15.8 NG/ML
ALBUMIN SERPL-MCNC: 4.3 G/DL (ref 3.5–5.2)
ALBUMIN/GLOB SERPL: 2 G/DL
ALP SERPL-CCNC: 108 U/L (ref 39–117)
ALT SERPL W P-5'-P-CCNC: 14 U/L (ref 1–33)
ANION GAP SERPL CALCULATED.3IONS-SCNC: 6.7 MMOL/L (ref 5–15)
AST SERPL-CCNC: 14 U/L (ref 1–32)
BASOPHILS # BLD AUTO: 0.07 10*3/MM3 (ref 0–0.2)
BASOPHILS NFR BLD AUTO: 1 % (ref 0–1.5)
BILIRUB SERPL-MCNC: 0.9 MG/DL (ref 0–1.2)
BUN SERPL-MCNC: 12 MG/DL (ref 8–23)
BUN/CREAT SERPL: 14.8 (ref 7–25)
CALCIUM SPEC-SCNC: 9.4 MG/DL (ref 8.6–10.5)
CHLORIDE SERPL-SCNC: 108 MMOL/L (ref 98–107)
CHOLEST SERPL-MCNC: 201 MG/DL (ref 0–200)
CO2 SERPL-SCNC: 28.3 MMOL/L (ref 22–29)
CREAT SERPL-MCNC: 0.81 MG/DL (ref 0.57–1)
DEPRECATED RDW RBC AUTO: 40.5 FL (ref 37–54)
EOSINOPHIL # BLD AUTO: 0.28 10*3/MM3 (ref 0–0.4)
EOSINOPHIL NFR BLD AUTO: 4.2 % (ref 0.3–6.2)
ERYTHROCYTE [DISTWIDTH] IN BLOOD BY AUTOMATED COUNT: 12.1 % (ref 12.3–15.4)
GFR SERPL CREATININE-BSD FRML MDRD: 72 ML/MIN/1.73
GLOBULIN UR ELPH-MCNC: 2.2 GM/DL
GLUCOSE SERPL-MCNC: 105 MG/DL (ref 65–99)
HBA1C MFR BLD: 5.84 % (ref 4.8–5.6)
HCT VFR BLD AUTO: 43.5 % (ref 34–46.6)
HDLC SERPL-MCNC: 52 MG/DL (ref 40–60)
HGB BLD-MCNC: 14.7 G/DL (ref 12–15.9)
IMM GRANULOCYTES # BLD AUTO: 0.01 10*3/MM3 (ref 0–0.05)
IMM GRANULOCYTES NFR BLD AUTO: 0.1 % (ref 0–0.5)
LDLC SERPL CALC-MCNC: 131 MG/DL (ref 0–100)
LDLC/HDLC SERPL: 2.48 {RATIO}
LYMPHOCYTES # BLD AUTO: 1.84 10*3/MM3 (ref 0.7–3.1)
LYMPHOCYTES NFR BLD AUTO: 27.3 % (ref 19.6–45.3)
MCH RBC QN AUTO: 31.1 PG (ref 26.6–33)
MCHC RBC AUTO-ENTMCNC: 33.8 G/DL (ref 31.5–35.7)
MCV RBC AUTO: 92 FL (ref 79–97)
MONOCYTES # BLD AUTO: 0.49 10*3/MM3 (ref 0.1–0.9)
MONOCYTES NFR BLD AUTO: 7.3 % (ref 5–12)
NEUTROPHILS NFR BLD AUTO: 4.04 10*3/MM3 (ref 1.7–7)
NEUTROPHILS NFR BLD AUTO: 60.1 % (ref 42.7–76)
NRBC BLD AUTO-RTO: 0.1 /100 WBC (ref 0–0.2)
PLATELET # BLD AUTO: 254 10*3/MM3 (ref 140–450)
PMV BLD AUTO: 11.4 FL (ref 6–12)
POTASSIUM SERPL-SCNC: 4.4 MMOL/L (ref 3.5–5.2)
PROT SERPL-MCNC: 6.5 G/DL (ref 6–8.5)
RBC # BLD AUTO: 4.73 10*6/MM3 (ref 3.77–5.28)
SODIUM SERPL-SCNC: 143 MMOL/L (ref 136–145)
T4 FREE SERPL-MCNC: 1.13 NG/DL (ref 0.93–1.7)
TRIGL SERPL-MCNC: 100 MG/DL (ref 0–150)
TSH SERPL DL<=0.05 MIU/L-ACNC: 1.29 UIU/ML (ref 0.27–4.2)
VIT B12 BLD-MCNC: 364 PG/ML (ref 211–946)
VLDLC SERPL-MCNC: 18 MG/DL (ref 5–40)
WBC # BLD AUTO: 6.73 10*3/MM3 (ref 3.4–10.8)

## 2021-10-20 PROCEDURE — 80050 GENERAL HEALTH PANEL: CPT

## 2021-10-20 PROCEDURE — 83036 HEMOGLOBIN GLYCOSYLATED A1C: CPT

## 2021-10-20 PROCEDURE — 82306 VITAMIN D 25 HYDROXY: CPT

## 2021-10-20 PROCEDURE — 84439 ASSAY OF FREE THYROXINE: CPT

## 2021-10-20 PROCEDURE — 80061 LIPID PANEL: CPT

## 2021-10-20 PROCEDURE — 82607 VITAMIN B-12: CPT

## 2021-10-21 ENCOUNTER — TELEPHONE (OUTPATIENT)
Dept: FAMILY MEDICINE CLINIC | Facility: CLINIC | Age: 61
End: 2021-10-21

## 2021-10-21 NOTE — TELEPHONE ENCOUNTER
----- Message from Pete Burns MD sent at 10/21/2021  7:26 AM CDT -----  Please call pt    Thyroid studies normal     Vitamin B12 is low normal and recommend pt start taking vitamin B12 supplements 500 mcg PO q daily. If pt agreeable give 30 pills and 3 refills     Hga1c at 5.84 and pt is prediabetic. Recommend pt watch sugar and carb intake     On CMP kidney function shows GFR at 72 from 66. She likely has Chronic Kidney disease stage 2. Will need to discuss next visit. Likely due to her hypertension     On lipid panel LDL improved from 214 4 years ago to 131. REcommend pt go up on lipitor from 20 to 40 mg PO qhs give 30 pills and 3 refills if pt agreeable. Also recommend CoQ10 OTC. To help reduce side effect. Continue with heart healthy diet     CBC shows stable hemoglobin and WBC.    Recheck on next visit thanks

## 2021-10-26 ENCOUNTER — TELEPHONE (OUTPATIENT)
Dept: FAMILY MEDICINE CLINIC | Facility: CLINIC | Age: 61
End: 2021-10-26

## 2021-10-26 RX ORDER — CHOLECALCIFEROL (VITAMIN D3) 125 MCG
500 CAPSULE ORAL DAILY
Qty: 30 TABLET | Refills: 3 | Status: SHIPPED | OUTPATIENT
Start: 2021-10-26 | End: 2023-02-22 | Stop reason: SDUPTHER

## 2021-10-26 RX ORDER — ATORVASTATIN CALCIUM 40 MG/1
40 TABLET, FILM COATED ORAL NIGHTLY
Qty: 30 TABLET | Refills: 3 | Status: SHIPPED | OUTPATIENT
Start: 2021-10-26 | End: 2022-07-19

## 2021-11-17 ENCOUNTER — OFFICE VISIT (OUTPATIENT)
Dept: FAMILY MEDICINE CLINIC | Facility: CLINIC | Age: 61
End: 2021-11-17

## 2021-11-17 VITALS
HEART RATE: 90 BPM | SYSTOLIC BLOOD PRESSURE: 118 MMHG | HEIGHT: 66 IN | DIASTOLIC BLOOD PRESSURE: 78 MMHG | BODY MASS INDEX: 32.95 KG/M2 | TEMPERATURE: 96.8 F | WEIGHT: 205 LBS | OXYGEN SATURATION: 98 %

## 2021-11-17 DIAGNOSIS — I10 ESSENTIAL HYPERTENSION: Primary | ICD-10-CM

## 2021-11-17 DIAGNOSIS — N18.2 STAGE 2 CHRONIC KIDNEY DISEASE: ICD-10-CM

## 2021-11-17 DIAGNOSIS — E55.9 VITAMIN D DEFICIENCY: ICD-10-CM

## 2021-11-17 DIAGNOSIS — E53.8 VITAMIN B12 DEFICIENCY: ICD-10-CM

## 2021-11-17 DIAGNOSIS — Z72.0 TOBACCO USER: ICD-10-CM

## 2021-11-17 DIAGNOSIS — B35.1 ONYCHOMYCOSIS: ICD-10-CM

## 2021-11-17 DIAGNOSIS — K21.9 GASTROESOPHAGEAL REFLUX DISEASE, UNSPECIFIED WHETHER ESOPHAGITIS PRESENT: ICD-10-CM

## 2021-11-17 DIAGNOSIS — E66.9 CLASS 1 OBESITY WITH SERIOUS COMORBIDITY AND BODY MASS INDEX (BMI) OF 33.0 TO 33.9 IN ADULT, UNSPECIFIED OBESITY TYPE: ICD-10-CM

## 2021-11-17 DIAGNOSIS — E78.2 MIXED HYPERLIPIDEMIA: ICD-10-CM

## 2021-11-17 PROCEDURE — 99214 OFFICE O/P EST MOD 30 MIN: CPT | Performed by: FAMILY MEDICINE

## 2021-11-17 RX ORDER — TERBINAFINE HYDROCHLORIDE 250 MG/1
250 TABLET ORAL DAILY
Qty: 30 TABLET | Refills: 2 | Status: SHIPPED | OUTPATIENT
Start: 2021-11-17 | End: 2023-03-22

## 2021-11-17 RX ORDER — ERGOCALCIFEROL 1.25 MG/1
50000 CAPSULE ORAL
Qty: 5 CAPSULE | Refills: 3 | Status: SHIPPED | OUTPATIENT
Start: 2021-11-17 | End: 2022-04-04

## 2021-11-17 NOTE — PATIENT INSTRUCTIONS
Chronic Kidney Disease, Adult  Chronic kidney disease (CKD) occurs when the kidneys are slowly and permanently damaged over a long period of time. The kidneys are a pair of organs that do many important jobs in the body, including:  · Removing waste and extra fluid from the blood to make urine.  · Making hormones that maintain the amount of fluid in tissues and blood vessels.  · Maintaining the right amount of fluids and chemicals in the body.  A small amount of kidney damage may not cause problems, but a large amount of damage may make it hard or impossible for the kidneys to work right. Steps must be taken to slow kidney damage or to stop it from getting worse. If steps are not taken, the kidneys may stop working permanently (end-stage renal disease, or ESRD). Most of the time, CKD does not go away, but it can often be controlled. People who have CKD are usually able to live full lives.  What are the causes?  The most common causes of this condition are diabetes and high blood pressure (hypertension).  Other causes include:  · Cardiovascular diseases. These affect the heart and blood vessels.  · Kidney diseases. These include:  ? Glomerulonephritis, or inflammation of the tiny filters in the kidneys.  ? Interstitial nephritis. This is swelling of the small tubes of the kidneys and of the surrounding structures.  ? Polycystic kidney disease, in which clusters of fluid-filled sacs form within the kidneys.  ? Renal vascular disease. This includes disorders that affect the arteries and veins of the kidneys.  · Diseases that affect the body's defense system (immune system).  · A problem with urine flow. This may be caused by:  ? Kidney stones.  ? Cancer.  ? An enlarged prostate, in males.  · A kidney infection or urinary tract infection (UTI) that keeps coming back.  · Vasculitis. This is swelling or inflammation of the blood vessels.  What increases the risk?  Your chances of having kidney disease increase with  age.  The following factors may make you more likely to develop this condition:  · A family history of kidney disease or kidney failure. Kidney failure means the kidneys can no longer work right.  · Certain genetic diseases.  · Taking medicines often that are damaging to the kidneys.  · Being around or being in contact with toxic substances.  · Obesity.  · A history of tobacco use.  What are the signs or symptoms?  Symptoms of this condition include:  · Feeling very tired (lethargic) and having less energy.  · Swelling, or edema, of the face, legs, ankles, or feet.  · Nausea or vomiting, or loss of appetite.  · Confusion or trouble concentrating.  · Muscle twitches and cramps, especially in the legs.  · Dry, itchy skin.  · A metallic taste in the mouth.  · Producing less urine, or producing more urine (especially at night).  · Shortness of breath.  · Trouble sleeping.  CKD may also result in not having enough red blood cells or hemoglobin in the blood (anemia) or having weak bones (bone disease).  Symptoms develop slowly and may not be obvious until the kidney damage becomes severe. It is possible to have kidney disease for years without having symptoms.  How is this diagnosed?  This condition may be diagnosed based on:  · Blood tests.  · Urine tests.  · Imaging tests, such as an ultrasound or a CT scan.  · A kidney biopsy. This involves removing a sample of kidney tissue to be looked at under a microscope.  Results from these tests will help to determine how serious the CKD is.  How is this treated?  There is no cure for most cases of this condition, but treatment usually relieves symptoms and prevents or slows the worsening of the disease. Treatment may include:  · Diet changes, which may require you to avoid alcohol and foods that are high in salt, potassium, phosphorous, and protein.  · Medicines. These may:  ? Lower blood pressure.  ? Control blood sugar (glucose).  ? Relieve anemia.  ? Relieve  swelling.  ? Protect your bones.  ? Improve the balance of salts and minerals in your blood (electrolytes).  · Dialysis, which is a type of treatment that removes toxic waste from the body. It may be needed if you have kidney failure.  · Managing any other conditions that are causing your CKD or making it worse.  Follow these instructions at home:  Medicines  · Take over-the-counter and prescription medicines only as told by your health care provider. The amount of some medicines that you take may need to be changed.  · Do not take any new medicines unless approved by your health care provider. Many medicines can make kidney damage worse.  · Do not take any vitamin and mineral supplements unless approved by your health care provider. Many nutritional supplements can make kidney damage worse.  Lifestyle    · Do not use any products that contain nicotine or tobacco, such as cigarettes, e-cigarettes, and chewing tobacco. If you need help quitting, ask your health care provider.  · If you drink alcohol:  ? Limit how much you use to:  § 0-1 drink a day for women who are not pregnant.  § 0-2 drinks a day for men.  ? Know how much alcohol is in your drink. In the [place] one drink equals one 12 oz bottle of beer (355 mL), one 5 oz glass of wine (148 mL), or one 1½ oz glass of hard liquor (44 mL).  · Maintain a healthy weight. If you need help, ask your health care provider.    General instructions    · Follow instructions from your health care provider about eating or drinking restrictions, including any prescribed diet.  · Track your blood pressure at home. Report changes in your blood pressure as told.  · If you are being treated for diabetes, track your blood glucose levels as told.  · Start or continue an exercise plan. Exercise at least 30 minutes a day, 5 days a week.  · Keep your immunizations up to date as told.  · Keep all follow-up visits. This is important.    Where to find more information  · American Association  "of Kidney Patients: www.aakp.org  · National Kidney Foundation: www.kidney.org  · American Kidney Fund: www.akfinc.org  · Life Options: www.lifeoptions.org  · Kidney School: www.kidneyschool.org  Contact a health care provider if:  · Your symptoms get worse.  · You develop new symptoms.  Get help right away if:  · You develop symptoms of ESRD. These include:  ? Headaches.  ? Numbness in your hands or feet.  ? Easy bruising.  ? Frequent hiccups.  ? Chest pain.  ? Shortness of breath.  ? Lack of menstrual periods, in women.  · You have a fever.  · You are producing less urine than usual.  · You have pain or bleeding when you urinate or when you have a bowel movement.  These symptoms may represent a serious problem that is an emergency. Do not wait to see if the symptoms will go away. Get medical help right away. Call your local emergency services (911 in the [place] Do not drive yourself to the hospital.  Summary  · Chronic kidney disease (CKD) occurs when the kidneys become damaged slowly over a long period of time.  · The most common causes of this condition are diabetes and high blood pressure (hypertension).  · There is no cure for most cases of CKD, but treatment usually relieves symptoms and prevents or slows the worsening of the disease. Treatment may include a combination of lifestyle changes, medicines, and dialysis.  This information is not intended to replace advice given to you by your health care provider. Make sure you discuss any questions you have with your health care provider.  Document Revised: 03/24/2021 Document Reviewed: 03/24/2021  [employer] Patient Education © 2021 [employer]    Diabetes Care, 44(Suppl 1), S34-S39. [email or web address]/[email or web address]\">   Prediabetes  Prediabetes is when your blood sugar (blood glucose) level is higher than normal but not high enough for you to be diagnosed with type 2 diabetes. Having prediabetes puts you at risk for developing type 2 diabetes " (type 2 diabetes mellitus).  With certain lifestyle changes, you may be able to prevent or delay the onset of type 2 diabetes. This is important because type 2 diabetes can lead to serious complications, such as:  · Heart disease.  · Stroke.  · Blindness.  · Kidney disease.  · Depression.  · Poor circulation in the feet and legs. In severe cases, this could lead to surgical removal of a leg (amputation).  What are the causes?  The exact cause of prediabetes is not known. It may result from insulin resistance. Insulin resistance develops when cells in the body do not respond properly to insulin that the body makes. This can cause excess glucose to build up in the blood. High blood glucose (hyperglycemia) can develop.  What increases the risk?  The following factors may make you more likely to develop this condition:  · You have a family member with type 2 diabetes.  · You are older than 45 years.  · You had a temporary form of diabetes during a pregnancy (gestational diabetes).  · You had polycystic ovary syndrome (PCOS).  · You are overweight or obese.  · You are inactive (sedentary).  · You have a history of heart disease, including problems with cholesterol levels, high levels of blood fats, or high blood pressure.  What are the signs or symptoms?  You may have no symptoms. If you do have symptoms, they may include:  · Increased hunger.  · Increased thirst.  · Increased urination.  · Vision changes, such as blurry vision.  · Tiredness (fatigue).  How is this diagnosed?  This condition can be diagnosed with blood tests. Your blood glucose may be checked with one or more of the following tests:  · A fasting blood glucose (FBG) test. You will not be allowed to eat (you will fast) for at least 8 hours before a blood sample is taken.  · An A1C blood test (hemoglobin A1C). This test provides information about blood glucose levels over the previous 2?3 months.  · An oral glucose tolerance test (OGTT). This test measures  your blood glucose at two points in time:  ? After fasting. This is your baseline level.  ? Two hours after you drink a beverage that contains glucose.  You may be diagnosed with prediabetes if:  · Your FBG is 100?125 mg/dL (5.6-6.9 mmol/L).  · Your A1C level is 5.7?6.4% (39-46 mmol/mol).  · Your OGTT result is 140?199 mg/dL (7.8-11 mmol/L).  These blood tests may be repeated to confirm your diagnosis.  How is this treated?  Treatment may include dietary and lifestyle changes to help lower your blood glucose and prevent type 2 diabetes from developing. In some cases, medicine may be prescribed to help lower the risk of type 2 diabetes.  Follow these instructions at home:  Nutrition    · Follow a healthy meal plan. This includes eating lean proteins, whole grains, legumes, fresh fruits and vegetables, low-fat dairy products, and healthy fats.  · Follow instructions from your health care provider about eating or drinking restrictions.  · Meet with a dietitian to create a healthy eating plan that is right for you.    Lifestyle  · Do moderate-intensity exercise for at least 30 minutes a day on 5 or more days each week, or as told by your health care provider. A mix of activities may be best, such as:  ? Brisk walking, swimming, biking, and weight lifting.  · Lose weight as told by your health care provider. Losing 5-7% of your body weight can reverse insulin resistance.  · Do not drink alcohol if:  ? Your health care provider tells you not to drink.  ? You are pregnant, may be pregnant, or are planning to become pregnant.  · If you drink alcohol:  ? Limit how much you use to:  § 0-1 drink a day for women.  § 0-2 drinks a day for men.  ? Be aware of how much alcohol is in your drink. In the [place] one drink equals one 12 oz bottle of beer (355 mL), one 5 oz glass of wine (148 mL), or one 1½ oz glass of hard liquor (44 mL).  General instructions  · Take over-the-counter and prescription medicines only as told by your  health care provider. You may be prescribed medicines that help lower the risk of type 2 diabetes.  · Do not use any products that contain nicotine or tobacco, such as cigarettes, e-cigarettes, and chewing tobacco. If you need help quitting, ask your health care provider.  · Keep all follow-up visits. This is important.  Where to find more information  · American Diabetes Association: www.diabetes.org  · Academy of Nutrition and Dietetics: www.eatright.org  · American Heart Association: www.heart.org  Contact a health care provider if:  · You have any of these symptoms:  ? Increased hunger.  ? Increased urination.  ? Increased thirst.  ? Fatigue.  ? Vision changes, such as blurry vision.  Get help right away if you:  · Have shortness of breath.  · Feel confused.  · Vomit or feel like you may vomit.  Summary  · Prediabetes is when your blood sugar (blood glucose)level is higher than normal but not high enough for you to be diagnosed with type 2 diabetes.  · Having prediabetes puts you at risk for developing type 2 diabetes (type 2 diabetes mellitus).  · Make lifestyle changes such as eating a healthy diet and exercising regularly to help prevent diabetes. Lose weight as told by your health care provider.  This information is not intended to replace advice given to you by your health care provider. Make sure you discuss any questions you have with your health care provider.  Document Revised: 03/18/2021 Document Reviewed: 03/18/2021  [email or web address] Patient Education © 2021 [email or web address]    Prediabetes Eating Plan  Prediabetes is a condition that causes blood sugar (glucose) levels to be higher than normal. This increases the risk for developing type 2 diabetes (type 2 diabetes mellitus). Working with a health care provider or nutrition specialist (dietitian) to make diet and lifestyle changes can help prevent the onset of diabetes. These changes may help you:  · Control your blood glucose levels.  · Improve your cholesterol  levels.  · Manage your blood pressure.  What are tips for following this plan?  Reading food labels  · Read food labels to check the amount of fat, salt (sodium), and sugar in prepackaged foods. Avoid foods that have:  ? Saturated fats.  ? Trans fats.  ? Added sugars.  · Avoid foods that have more than 300 milligrams (mg) of sodium per serving. Limit your sodium intake to less than 2,300 mg each day.  Shopping  · Avoid buying pre-made and processed foods.  · Avoid buying drinks with added sugar.  Cooking  · Cook with olive oil. Do not use butter, lard, or ghee.  · Bake, broil, grill, steam, or boil foods. Avoid frying.  Meal planning    · Work with your dietitian to create an eating plan that is right for you. This may include tracking how many calories you take in each day. Use a food diary, notebook, or mobile application to track what you eat at each meal.  · Consider following a Mediterranean diet. This includes:  ? Eating several servings of fresh fruits and vegetables each day.  ? Eating fish at least twice a week.  ? Eating one serving each day of whole grains, beans, nuts, and seeds.  ? Using olive oil instead of other fats.  ? Limiting alcohol.  ? Limiting red meat.  ? Using nonfat or low-fat dairy products.  · Consider following a plant-based diet. This includes dietary choices that focus on eating mostly vegetables and fruit, grains, beans, nuts, and seeds.  · If you have high blood pressure, you may need to limit your sodium intake or follow a diet such as the DASH (Dietary Approaches to Stop Hypertension) eating plan. The DASH diet aims to lower high blood pressure.    Lifestyle  · Set weight loss goals with help from your health care team. It is recommended that most people with prediabetes lose 7% of their body weight.  · Exercise for at least 30 minutes 5 or more days a week.  · Attend a support group or seek support from a mental health counselor.  · Take over-the-counter and prescription medicines  only as told by your health care provider.  What foods are recommended?  Fruits  Berries. Bananas. Apples. Oranges. Grapes. Papaya. [place]. Pomegranate. Kiwi. Grapefruit. Cherries.  Vegetables  Lettuce. Spinach. Peas. Beets. Cauliflower. Cabbage. Broccoli. Carrots. Tomatoes. Squash. Eggplant. Herbs. Peppers. Onions. Cucumbers. [place] sprouts.  Grains  Whole grains, such as whole-wheat or whole-grain breads, crackers, cereals, and pasta. Unsweetened oatmeal. Bulgur. Barley. Quinoa. Brown rice. Corn or whole-wheat flour tortillas or taco shells.  Meats and other proteins  Seafood. Poultry without skin. Lean cuts of pork and beef. Tofu. Eggs. Nuts. Beans.  Dairy  Low-fat or fat-free dairy products, such as yogurt, cottage cheese, and cheese.  Beverages  Water. Tea. Coffee. Sugar-free or diet soda. [place] water. Low-fat or nonfat milk. Milk alternatives, such as soy or almond milk.  Fats and oils  Olive oil. Canola oil. Sunflower oil. Grapeseed oil. Avocado. Walnuts.  Sweets and desserts  Sugar-free or low-fat pudding. Sugar-free or low-fat ice cream and other frozen treats.  Seasonings and condiments  Herbs. Sodium-free spices. Mustard. Relish. Low-salt, low-sugar ketchup. Low-salt, low-sugar barbecue sauce. Low-fat or fat-free mayonnaise.  The items listed above may not be a complete list of recommended foods and beverages. Contact a dietitian for more information.  What foods are not recommended?  Fruits  Fruits canned with syrup.  Vegetables  Canned vegetables. Frozen vegetables with butter or cream sauce.  Grains  Refined white flour and flour products, such as bread, pasta, snack foods, and cereals.  Meats and other proteins  Fatty cuts of meat. Poultry with skin. Breaded or fried meat. Processed meats.  Dairy  Full-fat yogurt, cheese, or milk.  Beverages  Sweetened drinks, such as iced tea and soda.  Fats and oils  Butter. Lard. Ghee.  Sweets and desserts  Baked goods, such as cake, cupcakes, pastries, cookies,  and cheesecake.  Seasonings and condiments  Spice mixes with added salt. Ketchup. Barbecue sauce. Mayonnaise.  The items listed above may not be a complete list of foods and beverages that are not recommended. Contact a dietitian for more information.  Where to find more information  · American Diabetes Association: www.diabetes.org  Summary  · You may need to make diet and lifestyle changes to help prevent the onset of diabetes. These changes can help you control blood sugar, improve cholesterol levels, and manage blood pressure.  · Set weight loss goals with help from your health care team. It is recommended that most people with prediabetes lose 7% of their body weight.  · Consider following a Mediterranean diet. This includes eating plenty of fresh fruits and vegetables, whole grains, beans, nuts, seeds, fish, and low-fat dairy, and using olive oil instead of other fats.  This information is not intended to replace advice given to you by your health care provider. Make sure you discuss any questions you have with your health care provider.  Document Revised: 03/18/2021 Document Reviewed: 03/18/2021  [employer] Patient Education © 2021 [employer]  Terbinafine tablets  What is this medicine?  TERBINAFINE (TER bin a feen) is an antifungal medicine. It is used to treat certain kinds of fungal or yeast infections.  This medicine may be used for other purposes; ask your health care provider or pharmacist if you have questions.  COMMON BRAND NAME(S): Lamisil, Terbinex  What should I tell my health care provider before I take this medicine?  They need to know if you have any of these conditions:  · drink alcoholic beverages  · kidney disease  · liver disease  · an unusual or allergic reaction to terbinafine, other medicines, foods, dyes, or preservatives  · pregnant or trying to get pregnant  · breast-feeding  How should I use this medicine?  Take this medicine by mouth with a full glass of water. Follow the directions  on the prescription label. You can take this medicine with food or on an empty stomach. Take your medicine at regular intervals. Do not take your medicine more often than directed. Do not skip doses or stop your medicine early even if you feel better. Do not stop taking except on your doctor's advice.  A special MedGuide will be given to you by the pharmacist with each prescription and refill. Be sure to read this information carefully each time.  Talk to your pediatrician regarding the use of this medicine in children. Special care may be needed.  Overdosage: If you think you have taken too much of this medicine contact a poison control center or emergency room at once.  NOTE: This medicine is only for you. Do not share this medicine with others.  What if I miss a dose?  If you miss a dose, take it as soon as you can. If it is almost time for your next dose, take only that dose. Do not take double or extra doses.  What may interact with this medicine?  Do not take this medicine with any of the following medications:  · thioridazine  This medicine may also interact with the following medications:  · beta-blockers  · caffeine  · cimetidine  · cyclosporine  · medicines for depression, anxiety, or psychotic disturbances  · medicines for fungal infections like fluconazole and ketoconazole  · medicines for irregular heartbeat like amiodarone, flecainide and propafenone  · rifampin  · warfarin  This list may not describe all possible interactions. Give your health care provider a list of all the medicines, herbs, non-prescription drugs, or dietary supplements you use. Also tell them if you smoke, drink alcohol, or use illegal drugs. Some items may interact with your medicine.  What should I watch for while using this medicine?  Visit your doctor or health care provider regularly. Tell your doctor right away if you have nausea or vomiting, loss of appetite, stomach pain on your right upper side, yellow skin, dark urine,  light stools, or are over tired. Some fungal infections need many weeks or months of treatment to cure. If you are taking this medicine for a long time, you will need to have important blood work done.  This medicine may cause serious skin reactions. They can happen weeks to months after starting the medicine. Contact your health care provider right away if you notice fevers or flu-like symptoms with a rash. The rash may be red or purple and then turn into blisters or peeling of the skin. Or, you might notice a red rash with swelling of the face, lips or lymph nodes in your neck or under your arms.  What side effects may I notice from receiving this medicine?  Side effects that you should report to your doctor or health care professional as soon as possible:  · allergic reactions like skin rash or hives, swelling of the face, lips, or tongue  · changes in vision  · dark urine  · fever or infection  · general ill feeling or flu-like symptoms  · light-colored stools  · loss of appetite, nausea  · rash, fever, and swollen lymph nodes  · redness, blistering, peeling or loosening of the skin, including inside the mouth  · right upper belly pain  · unusually weak or tired  · yellowing of the eyes or skin  Side effects that usually do not require medical attention (report to your doctor or health care professional if they continue or are bothersome):  · changes in taste  · diarrhea  · hair loss  · muscle or joint pain  · stomach gas  · stomach upset  This list may not describe all possible side effects. Call your doctor for medical advice about side effects. You may report side effects to FDA at 8-927-WCP-2856.  Where should I keep my medicine?  Keep out of the reach of children.  Store at room temperature below 25 degrees C (77 degrees F). Protect from light. Throw away any unused medicine after the expiration date.  NOTE: This sheet is a summary. It may not cover all possible information. If you have questions about this  medicine, talk to your doctor, pharmacist, or health care provider.  © 2021 [employer] (2020-03-27 15:37:07)    Fungal Nail Infection  A fungal nail infection is a common infection of the toenails or fingernails. This condition affects toenails more often than fingernails. It often affects the great, or big, toes. More than one nail may be infected. The condition can be passed from person to person (is contagious).  What are the causes?  This condition is caused by a fungus. Several types of fungi can cause the infection. These fungi are common in moist and warm areas. If your hands or feet come into contact with the fungus, it may get into a crack in your fingernail or toenail and cause the infection.  What increases the risk?  The following factors may make you more likely to develop this condition:  · Being male.  · Being of older age.  · Living with someone who has the fungus.  · Walking barefoot in areas where the fungus thrives, such as showers or locker rooms.  · Wearing shoes and socks that cause your feet to sweat.  · Having a nail injury or a recent nail surgery.  · Having certain medical conditions, such as:  ? Athlete's foot.  ? Diabetes.  ? Psoriasis.  ? Poor circulation.  ? A weak body defense system (immune system).  What are the signs or symptoms?  Symptoms of this condition include:  · A pale spot on the nail.  · Thickening of the nail.  · A nail that becomes yellow or brown.  · A brittle or ragged nail edge.  · A crumbling nail.  · A nail that has lifted away from the nail bed.  How is this diagnosed?  This condition is diagnosed with a physical exam. Your health care provider may take a scraping or clipping from your nail to test for the fungus.  How is this treated?  Treatment is not needed for mild infections. If you have significant nail changes, treatment may include:  · Antifungal medicines taken by mouth (orally). You may need to take the medicine for several weeks or several  months, and you may not see the results for a long time. These medicines can cause side effects. Ask your health care provider what problems to watch for.  · Antifungal nail polish or nail cream. These may be used along with oral antifungal medicines.  · Laser treatment of the nail.  · Surgery to remove the nail. This may be needed for the most severe infections.  It can take a long time, usually up to a year, for the infection to go away. The infection may also come back.  Follow these instructions at home:  Medicines  · Take or apply over-the-counter and prescription medicines only as told by your health care provider.  · Ask your health care provider about using over-the-counter mentholated ointment on your nails.  Nail care  · Trim your nails often.  · Wash and dry your hands and feet every day.  · Keep your feet dry:  ? Wear absorbent socks, and change your socks frequently.  ? Wear shoes that allow air to circulate, such as sandals or canvas tennis shoes. Throw out old shoes.  · Do not use artificial nails.  · If you go to a nail salon, make sure you choose one that uses clean instruments.  · Use antifungal foot powder on your feet and in your shoes.  General instructions  · Do not share personal items, such as towels or nail clippers.  · Do not walk barefoot in shower rooms or locker rooms.  · Wear rubber gloves if you are working with your hands in wet areas.  · Keep all follow-up visits as told by your health care provider. This is important.  Contact a health care provider if:  Your infection is not getting better or it is getting worse after several months.  Summary  · A fungal nail infection is a common infection of the toenails or fingernails.  · Treatment is not needed for mild infections. If you have significant nail changes, treatment may include taking medicine orally and applying medicine to your nails.  · It can take a long time, usually up to a year, for the infection to go away. The infection  may also come back.  · Take or apply over-the-counter and prescription medicines only as told by your health care provider.  · Follow instructions for taking care of your nails to help prevent infection from coming back or spreading.  This information is not intended to replace advice given to you by your health care provider. Make sure you discuss any questions you have with your health care provider.  Document Revised: 04/09/2020 Document Reviewed: 05/24/2019  [employer] Patient Education © 2021 [employer].  Black Cohosh, Cimicifuga racemosa oral dosage forms  What is this medicine?  BLACK COHOSH (geo KOH hosh) or Cimicifuga racemosa is a dietary supplement. It is promoted to relieve symptoms of menopause, such as hot flashes. The FDA has not approved this supplement for any medical use.  This supplement may be used for other purposes; ask your health care provider or pharmacist if you have questions.  This medicine may be used for other purposes; ask your health care provider or pharmacist if you have questions.  What should I tell my health care provider before I take this medicine?  They need to know if you have any of these conditions:  · breast cancer  · cervical, ovarian or uterine cancer  · high blood pressure  · infertility  · liver disease  · menstrual changes or irregular periods  · unusual vaginal or uterine bleeding  · an unusual or allergic reaction to black cohosh, soybeans, tartrazine dye (yellow dye number 5), other medicines, foods, dyes, or preservatives  · pregnant or trying to get pregnant  · breast-feeding  How should I use this medicine?  Take this herb by mouth with a glass of water. Follow the directions on the package labeling, or talk to your health care professional. Do not use for longer than 6 months without the advice of a health care professional. Do not use if you are pregnant or breast-feeding. Talk to your obstetrician-gynecologist or certified nurse-midwife.  This herb is not  for use in children under the age of 18 years.  Overdosage: If you think you have taken too much of this medicine contact a poison control center or emergency room at once.  NOTE: This medicine is only for you. Do not share this medicine with others.  What if I miss a dose?  If you miss a dose, take it as soon as you can. If it is almost time for your next dose, take only that dose. Do not take double or extra doses.  What may interact with this medicine?  · atorvastatin  · cisplatin  · fertility treatments  This list may not describe all possible interactions. Give your health care provider a list of all the medicines, herbs, non-prescription drugs, or dietary supplements you use. Also tell them if you smoke, drink alcohol, or use illegal drugs. Some items may interact with your medicine.  What should I watch for while using this medicine?  Since this herb is derived from a plant, allergic reactions are possible. Stop using this herb if you develop a rash. You may need to see your health care professional, or inform them that this occurred. Report any unusual side effects promptly.  If you are taking this herb for menstrual or menopausal symptoms, visit your doctor or health care professional for regular checks on your progress. You should have a complete check-up every 6 months. You will need a regular breast and pelvic exam while on this therapy. Follow the advice of your doctor or health care professional.  Women should inform their doctor if they wish to become pregnant or think they might be pregnant. If you have any reason to think you are pregnant, stop taking this herb at once and contact your doctor or health care professional.  Herbal or dietary supplements are not regulated like medicines. Rigid  standards are not required for dietary supplements. The purity and strength of these products can vary. The safety and effect of this dietary supplement for a certain disease or illness is not  well known. This product is not intended to diagnose, treat, cure or prevent any disease.  The Food and Drug Administration suggests the following to help consumers protect themselves:  · Always read product labels and follow directions.  · Natural does not mean a product is safe for humans to take.  · Look for products that include USP after the ingredient name. This means that the  followed the standards of the [place] Pharmacopoeia.  · Supplements made or sold by a nationally known food or drug company are more likely to be made under tight controls. You can write to the company for more information about how the product was made.  What side effects may I notice from receiving this medicine?  Side effects that you should report to your doctor or health care professional as soon as possible:  · allergic reactions like skin rash, itching or hives, swelling of the face, lips, or tongue  · breathing problems  · dizziness  · palpitations  · signs and symptoms of liver injury like dark yellow or brown urine; general ill feeling or flu-like symptoms; light-colored stools; loss of appetite; nausea; right upper belly pain; unusually weak or tired; yellowing of the eyes or skin  · unusual vaginal bleeding  Side effects that usually do not require medical attention (report to your doctor or health care professional if they continue or are bothersome):  · breast tenderness  · headache  · nausea  · upset stomach  This list may not describe all possible side effects. Call your doctor for medical advice about side effects. You may report side effects to FDA at 0-541-FDA-7836.  Where should I keep my medicine?  Keep out of the reach of children.  Store at room temperature between 15 and 30 degrees C (59 and 86 degrees C). Throw away any unused herb after the expiration date.  NOTE: This sheet is a summary. It may not cover all possible information. If you have questions about this medicine, talk to your doctor,  pharmacist, or health care provider.  © 2021 [employer] (2017-06-28 14:35:09)    Vitamin D Deficiency  Vitamin D deficiency is when your body does not have enough vitamin D. Vitamin D is important to your body because:  · It helps your body use other minerals.  · It helps to keep your bones strong and healthy.  · It may help to prevent some diseases.  · It helps your heart and other muscles work well.  Not getting enough vitamin D can make your bones soft. It can also cause other health problems.  What are the causes?  This condition may be caused by:  · Not eating enough foods that contain vitamin D.  · Not getting enough sun.  · Having diseases that make it hard for your body to absorb vitamin D.  · Having a surgery in which a part of the stomach or a part of the small intestine is removed.  · Having kidney disease or liver disease.  What increases the risk?  You are more likely to get this condition if:  · You are older.  · You do not spend much time outdoors.  · You live in a nursing home.  · You have had broken bones.  · You have weak or thin bones (osteoporosis).  · You have a disease or condition that changes how your body absorbs vitamin D.  · You have dark skin.  · You take certain medicines.  · You are overweight or obese.  What are the signs or symptoms?  · In mild cases, there may not be any symptoms. If the condition is very bad, symptoms may include:  ? Bone pain.  ? Muscle pain.  ? Falling often.  ? Broken bones caused by a minor injury.  How is this treated?  Treatment may include taking supplements as told by your doctor. Your doctor will tell you what dose is best for you. Supplements may include:  · Vitamin D.  · Calcium.  Follow these instructions at home:  Eating and drinking    · Eat foods that contain vitamin D, such as:  ? Dairy products, cereals, or juices with added vitamin D. Check the label.  ? Fish, such as salmon or trout.  ? Eggs.  ? Oysters.  ? Mushrooms.  The items listed  above may not be a complete list of what you can eat and drink. Contact a dietitian for more options.  General instructions  · Take medicines and supplements only as told by your doctor.  · Get regular, safe exposure to natural sunlight.  · Do not use a tanning bed.  · Maintain a healthy weight. Lose weight if needed.  · Keep all follow-up visits as told by your doctor. This is important.  How is this prevented?  · You can get vitamin D by:  ? Eating foods that naturally contain vitamin D.  ? Eating or drinking products that have vitamin D added to them, such as cereals, juices, and milk.  ? Taking vitamin D or a multivitamin that contains vitamin D.  ? Being in the sun. Your body makes vitamin D when your skin is exposed to sunlight. Your body changes the sunlight into a form of the vitamin that it can use.  Contact a doctor if:  · Your symptoms do not go away.  · You feel sick to your stomach (nauseous).  · You throw up (vomit).  · You poop less often than normal, or you have trouble pooping (constipation).  Summary  · Vitamin D deficiency is when your body does not have enough vitamin D.  · Vitamin D helps to keep your bones strong and healthy.  · This condition is often treated by taking a supplement.  · Your doctor will tell you what dose is best for you.  This information is not intended to replace advice given to you by your health care provider. Make sure you discuss any questions you have with your health care provider.  Document Revised: 08/26/2019 Document Reviewed: 08/26/2019  [employer] Patient Education © 2021 [employer]  Vitamin D Oral Tablets and Capsules  What is this medicine?  Vitamin D (VYE ta min D) is a nutrient needed for good health. It helps your body keep the right amount of calcium and phosphorus for healthy bones and teeth.  This medicine may be used for other purposes; ask your health care provider or pharmacist if you have questions.  COMMON BRAND NAME(S): Myrtle HUNG Drisdol,  Ergo D, MAXIMUM D3, Thera-D 2000, Thera-D 4000, Thera-D Rapid Repletion, THERA-D SPORT  What should I tell my health care provider before I take this medicine?  They need to know if you have any of the following conditions:  · cystic fibrosis  · gallbladder disease  · high levels of calcium in the blood  · high levels of vitamin D in the blood  · inflammatory bowel disease such as Crohn's disease or ulcerative colitis  · kidney disease  · liver disease  · parathyroid disease  · other stomach disease  · an unusual or allergic reaction to vitamin D, other medicines, foods, dyes, or preservatives  · pregnant or trying to get pregnant  · breast-feeding  How should I use this medicine?  Take this medicine by mouth with water. Take it as directed on the label at the same time every day. Take it with a meal or snack; for best results, take it with foods that contain fat (i.e., milk, yogurt, cheese). Do not use it more often than directed.  Talk to your health care provider about the use of this medicine in children. Special care may be needed.  Overdosage: If you think you have taken too much of this medicine contact a poison control center or emergency room at once.  NOTE: This medicine is only for you. Do not share this medicine with others.  What if I miss a dose?  If you miss a dose, take it as soon as you can. If it is almost time for your next dose, take only that dose. Do not take double or extra doses.  What may interact with this medicine?  · antacids  · diuretics  · magnesium supplements  · medicines for cholesterol like cholestyramine, colesevelam, or colestipol  · medicines for seizures like phenytoin, fosphenytoin  · mineral oil  · orlistat  · phosphorus supplements  · rifampin  This list may not describe all possible interactions. Give your health care provider a list of all the medicines, herbs, non-prescription drugs, or dietary supplements you use. Also tell them if you smoke, drink alcohol, or use illegal  drugs. Some items may interact with your medicine.  What should I watch for while using this medicine?  Visit your health care provider for regular checks on your progress. You may need blood work done while you are taking this medicine. Tell your health care provider if your symptoms do not start to get better or if they get worse.  Do not take any non-prescription medicines that have vitamin D, phosphorus, magnesium, or calcium including antacids while taking this medicine, unless your health care provider says you can. The extra supplements can cause side effects.  What side effects may I notice from receiving this medicine?  Side effects that you should report to your doctor or health care professional as soon as possible:  · allergic reactions like skin rash, itching or hives, swelling of the face, lips, or tongue  · bone pain  · confusion  · fast, irregular heartbeat  · increased thirst  · increased urination (especially at night)  · muscle weakness  Side effects that usually do not require medical attention (Report these to your doctor or health care professional if they continue or are bothersome.):  · constipation  · fatigue  · headache  · loss of appetite  · nausea  This list may not describe all possible side effects. Call your doctor for medical advice about side effects. You may report side effects to FDA at 8-922-FDA-5758.  Where should I keep my medicine?  Keep out of the reach of children and pets.  Store at room temperature between 15 and 30 degrees C (59 and 86 degrees F). Protect from light. Get rid of any unused medicine after the expiration date.  To get rid of medicines that are no longer needed or have :  · Take the medicine to a medicine take-back program. Check with your pharmacy or law enforcement to find a location.  · If you cannot return the medicine, check the label or package insert to see if the medicine should be thrown out in the garbage or flushed down the toilet. If you are  not sure, ask your health care provider. If it is safe to put it in the trash, take the medicine out of the container. Mix the medicine with cat litter, dirt, coffee grounds, or other unwanted substance. Seal the mixture in a bag or container. Put it in the trash.  NOTE: This sheet is a summary. It may not cover all possible information. If you have questions about this medicine, talk to your doctor, pharmacist, or health care provider.  © 2021 [employer] (2021-07-22 16:05:52)

## 2022-02-02 ENCOUNTER — TELEPHONE (OUTPATIENT)
Dept: FAMILY MEDICINE CLINIC | Facility: CLINIC | Age: 62
End: 2022-02-02

## 2022-02-22 RX ORDER — TERBINAFINE HYDROCHLORIDE 250 MG/1
250 TABLET ORAL DAILY
Qty: 30 TABLET | Refills: 2 | OUTPATIENT
Start: 2022-02-22

## 2022-02-22 NOTE — TELEPHONE ENCOUNTER
Rx Refill Note  Requested Prescriptions     Refused Prescriptions Disp Refills   • terbinafine (lamiSIL) 250 MG tablet [Pharmacy Med Name: TERBINAFINE 250MG TABLETS] 30 tablet 2     Sig: Take 1 tablet by mouth Daily.      Last office visit with prescribing clinician: 11/17/2021      Next office visit with prescribing clinician: Visit date not found   {TIP  Encounters:     PT WAS TO ONLY TAKE FOR 3 MONTHS.         Chay Be LPN  02/22/22, 08:33 CST

## 2022-02-23 DIAGNOSIS — K21.9 GASTROESOPHAGEAL REFLUX DISEASE: ICD-10-CM

## 2022-02-23 RX ORDER — OMEPRAZOLE 40 MG/1
40 CAPSULE, DELAYED RELEASE ORAL DAILY
Qty: 30 CAPSULE | Refills: 1 | Status: SHIPPED | OUTPATIENT
Start: 2022-02-23 | End: 2022-04-26

## 2022-04-04 RX ORDER — ERGOCALCIFEROL 1.25 MG/1
CAPSULE ORAL
Qty: 5 CAPSULE | Refills: 3 | Status: SHIPPED | OUTPATIENT
Start: 2022-04-04 | End: 2022-10-28

## 2022-04-26 DIAGNOSIS — K21.9 GASTROESOPHAGEAL REFLUX DISEASE: ICD-10-CM

## 2022-04-26 RX ORDER — OMEPRAZOLE 40 MG/1
40 CAPSULE, DELAYED RELEASE ORAL DAILY
Qty: 30 CAPSULE | Refills: 1 | Status: SHIPPED | OUTPATIENT
Start: 2022-04-26 | End: 2022-07-05

## 2022-07-03 DIAGNOSIS — K21.9 GASTROESOPHAGEAL REFLUX DISEASE: ICD-10-CM

## 2022-07-05 RX ORDER — OMEPRAZOLE 40 MG/1
CAPSULE, DELAYED RELEASE ORAL
Qty: 30 CAPSULE | Refills: 1 | Status: SHIPPED | OUTPATIENT
Start: 2022-07-05 | End: 2023-02-22 | Stop reason: SDUPTHER

## 2022-07-19 RX ORDER — ATORVASTATIN CALCIUM 40 MG/1
40 TABLET, FILM COATED ORAL NIGHTLY
Qty: 30 TABLET | Refills: 0 | Status: SHIPPED | OUTPATIENT
Start: 2022-07-19 | End: 2023-02-22 | Stop reason: SDUPTHER

## 2022-10-28 RX ORDER — ERGOCALCIFEROL 1.25 MG/1
CAPSULE ORAL
Qty: 5 CAPSULE | Refills: 3 | Status: SHIPPED | OUTPATIENT
Start: 2022-10-28 | End: 2023-02-22 | Stop reason: SDUPTHER

## 2022-11-07 DIAGNOSIS — K21.9 GASTROESOPHAGEAL REFLUX DISEASE: ICD-10-CM

## 2022-11-07 RX ORDER — OMEPRAZOLE 40 MG/1
CAPSULE, DELAYED RELEASE ORAL
Qty: 30 CAPSULE | Refills: 1 | OUTPATIENT
Start: 2022-11-07

## 2022-11-07 NOTE — TELEPHONE ENCOUNTER
Rx Refill Note  Requested Prescriptions     Pending Prescriptions Disp Refills   • omeprazole (priLOSEC) 40 MG capsule [Pharmacy Med Name: OMEPRAZOLE 40MG CAPSULES] 30 capsule 1     Sig: TAKE 1 CAPSULE BY MOUTH DAILY      Last office visit with prescribing clinician: 11/17/2021      Next office visit with prescribing clinician: Visit date not found   {TIP  Encounters:     Proton Pump Inhibitors Protocol Failed 11/07/2022 03:57 AM    GFR>30 ml/min in past year        PT HAS NOT BEEN SEEN SINCE 11/17/21    {TIP  Please add Last Relevant Lab Date if appropriate  {TIP  Is Refill Pharmacy correct? YES  Chay Be LPN  11/07/22, 09:02 CST

## 2022-11-13 DIAGNOSIS — I10 ESSENTIAL HYPERTENSION: ICD-10-CM

## 2022-11-14 RX ORDER — LOSARTAN POTASSIUM 50 MG/1
50 TABLET ORAL DAILY
Qty: 30 TABLET | Refills: 0 | Status: SHIPPED | OUTPATIENT
Start: 2022-11-14 | End: 2023-02-22 | Stop reason: SDUPTHER

## 2022-11-14 NOTE — TELEPHONE ENCOUNTER
Rx Refill Note  Requested Prescriptions     Pending Prescriptions Disp Refills   • losartan (COZAAR) 50 MG tablet [Pharmacy Med Name: LOSARTAN 50MG TABLETS] 30 tablet 6     Sig: TAKE 1 TABLET BY MOUTH DAILY      Last office visit with prescribing clinician: 11/17/2021      Next office visit with prescribing clinician: Visit date not found   {TIP  Encounters:     ARB Protocol Failed 11/13/2022 03:55 AM   Protocol Details  Normal serum potassium on file within the past year    GFR > 30 in past year        PT HAS NOT BEEN SEEN SINCE 11/17/21    {TIP  Please add Last Relevant Lab Date if appropriate  {TIP  Is Refill Pharmacy correct? YES  Chay Be LPN  11/14/22, 09:18 CST

## 2022-12-22 ENCOUNTER — TELEPHONE (OUTPATIENT)
Dept: FAMILY MEDICINE CLINIC | Facility: CLINIC | Age: 62
End: 2022-12-22

## 2022-12-22 DIAGNOSIS — K21.9 GASTROESOPHAGEAL REFLUX DISEASE: ICD-10-CM

## 2022-12-22 NOTE — TELEPHONE ENCOUNTER
Contacted patient to see if she was going to come to appointment on 12/23. She wanted to cancel due to weather and will call back next week to reschedule.

## 2022-12-27 RX ORDER — OMEPRAZOLE 40 MG/1
40 CAPSULE, DELAYED RELEASE ORAL DAILY
Qty: 30 CAPSULE | Refills: 1 | OUTPATIENT
Start: 2022-12-27

## 2022-12-27 NOTE — TELEPHONE ENCOUNTER
Rx Refill Note  Requested Prescriptions     Pending Prescriptions Disp Refills   • omeprazole (priLOSEC) 40 MG capsule 30 capsule 1     Sig: Take 1 capsule by mouth Daily.      Last office visit with prescribing clinician: 11/17/2021   Last telemedicine visit with prescribing clinician: 12/22/2022   Next office visit with prescribing clinician: 12/22/2022   {TIP  Encounters:     PT HAS NOT BEEN SEEN IN OVER A YEAR.     Proton Pump Inhibitors Protocol Failed 12/22/2022 02:39 PM    GFR>30 ml/min in past year            {TIP  Please add Last Relevant Lab Date if appropriate             {TIP  Is Refill Pharmacy correct? YES    Would you like a call back once the refill request has been completed: [] Yes [] No    If the office needs to give you a call back, can they leave a voicemail: [] Yes [] No    Chay Be LPN  12/27/22, 09:46 CST
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
## 2023-02-22 ENCOUNTER — LAB (OUTPATIENT)
Dept: LAB | Facility: HOSPITAL | Age: 63
End: 2023-02-22
Payer: COMMERCIAL

## 2023-02-22 ENCOUNTER — OFFICE VISIT (OUTPATIENT)
Dept: FAMILY MEDICINE CLINIC | Facility: CLINIC | Age: 63
End: 2023-02-22
Payer: COMMERCIAL

## 2023-02-22 VITALS
WEIGHT: 198 LBS | SYSTOLIC BLOOD PRESSURE: 152 MMHG | HEART RATE: 107 BPM | OXYGEN SATURATION: 97 % | BODY MASS INDEX: 31.82 KG/M2 | TEMPERATURE: 97.6 F | HEIGHT: 66 IN | DIASTOLIC BLOOD PRESSURE: 94 MMHG

## 2023-02-22 DIAGNOSIS — E53.8 VITAMIN B12 DEFICIENCY: ICD-10-CM

## 2023-02-22 DIAGNOSIS — Z12.2 SCREENING FOR LUNG CANCER: ICD-10-CM

## 2023-02-22 DIAGNOSIS — E78.2 MIXED HYPERLIPIDEMIA: ICD-10-CM

## 2023-02-22 DIAGNOSIS — I10 ESSENTIAL HYPERTENSION: ICD-10-CM

## 2023-02-22 DIAGNOSIS — K21.9 GASTROESOPHAGEAL REFLUX DISEASE, UNSPECIFIED WHETHER ESOPHAGITIS PRESENT: ICD-10-CM

## 2023-02-22 DIAGNOSIS — E55.9 VITAMIN D DEFICIENCY: ICD-10-CM

## 2023-02-22 DIAGNOSIS — E66.9 CLASS 1 OBESITY WITH SERIOUS COMORBIDITY AND BODY MASS INDEX (BMI) OF 31.0 TO 31.9 IN ADULT, UNSPECIFIED OBESITY TYPE: ICD-10-CM

## 2023-02-22 DIAGNOSIS — N18.2 STAGE 2 CHRONIC KIDNEY DISEASE: ICD-10-CM

## 2023-02-22 DIAGNOSIS — Z12.11 ENCOUNTER FOR SCREENING COLONOSCOPY: ICD-10-CM

## 2023-02-22 DIAGNOSIS — M79.641 RIGHT HAND PAIN: Primary | ICD-10-CM

## 2023-02-22 DIAGNOSIS — K21.9 GASTROESOPHAGEAL REFLUX DISEASE: ICD-10-CM

## 2023-02-22 DIAGNOSIS — Z12.4 ENCOUNTER FOR PAPANICOLAOU SMEAR OF CERVIX: ICD-10-CM

## 2023-02-22 DIAGNOSIS — Z13.29 ENCOUNTER FOR SCREENING FOR ENDOCRINE DISORDER: ICD-10-CM

## 2023-02-22 DIAGNOSIS — Z72.0 TOBACCO USER: ICD-10-CM

## 2023-02-22 DIAGNOSIS — M19.90 ARTHRITIS: ICD-10-CM

## 2023-02-22 DIAGNOSIS — Z12.31 SCREENING MAMMOGRAM, ENCOUNTER FOR: ICD-10-CM

## 2023-02-22 LAB
25(OH)D3 SERPL-MCNC: 37.2 NG/ML (ref 30–100)
ALBUMIN SERPL-MCNC: 4.4 G/DL (ref 3.5–5.2)
ALBUMIN/GLOB SERPL: 2 G/DL
ALP SERPL-CCNC: 97 U/L (ref 39–117)
ALT SERPL W P-5'-P-CCNC: 14 U/L (ref 1–33)
ANION GAP SERPL CALCULATED.3IONS-SCNC: 11 MMOL/L (ref 5–15)
AST SERPL-CCNC: 18 U/L (ref 1–32)
BASOPHILS # BLD AUTO: 0.09 10*3/MM3 (ref 0–0.2)
BASOPHILS NFR BLD AUTO: 1.2 % (ref 0–1.5)
BILIRUB SERPL-MCNC: 0.7 MG/DL (ref 0–1.2)
BUN SERPL-MCNC: 9 MG/DL (ref 8–23)
BUN/CREAT SERPL: 11.3 (ref 7–25)
CALCIUM SPEC-SCNC: 9.6 MG/DL (ref 8.6–10.5)
CHLORIDE SERPL-SCNC: 106 MMOL/L (ref 98–107)
CHOLEST SERPL-MCNC: 256 MG/DL (ref 0–200)
CO2 SERPL-SCNC: 24 MMOL/L (ref 22–29)
CREAT SERPL-MCNC: 0.8 MG/DL (ref 0.57–1)
DEPRECATED RDW RBC AUTO: 39.5 FL (ref 37–54)
EGFRCR SERPLBLD CKD-EPI 2021: 83.9 ML/MIN/1.73
EOSINOPHIL # BLD AUTO: 0.27 10*3/MM3 (ref 0–0.4)
EOSINOPHIL NFR BLD AUTO: 3.6 % (ref 0.3–6.2)
ERYTHROCYTE [DISTWIDTH] IN BLOOD BY AUTOMATED COUNT: 12.1 % (ref 12.3–15.4)
GLOBULIN UR ELPH-MCNC: 2.2 GM/DL
GLUCOSE SERPL-MCNC: 124 MG/DL (ref 65–99)
HBA1C MFR BLD: 5.5 % (ref 4.8–5.6)
HCT VFR BLD AUTO: 43.7 % (ref 34–46.6)
HDLC SERPL-MCNC: 60 MG/DL (ref 40–60)
HGB BLD-MCNC: 14.9 G/DL (ref 12–15.9)
IMM GRANULOCYTES # BLD AUTO: 0.02 10*3/MM3 (ref 0–0.05)
IMM GRANULOCYTES NFR BLD AUTO: 0.3 % (ref 0–0.5)
LDLC SERPL CALC-MCNC: 182 MG/DL (ref 0–100)
LDLC/HDLC SERPL: 2.99 {RATIO}
LYMPHOCYTES # BLD AUTO: 2.42 10*3/MM3 (ref 0.7–3.1)
LYMPHOCYTES NFR BLD AUTO: 32 % (ref 19.6–45.3)
MCH RBC QN AUTO: 30.8 PG (ref 26.6–33)
MCHC RBC AUTO-ENTMCNC: 34.1 G/DL (ref 31.5–35.7)
MCV RBC AUTO: 90.3 FL (ref 79–97)
MONOCYTES # BLD AUTO: 0.42 10*3/MM3 (ref 0.1–0.9)
MONOCYTES NFR BLD AUTO: 5.5 % (ref 5–12)
NEUTROPHILS NFR BLD AUTO: 4.35 10*3/MM3 (ref 1.7–7)
NEUTROPHILS NFR BLD AUTO: 57.4 % (ref 42.7–76)
NRBC BLD AUTO-RTO: 0 /100 WBC (ref 0–0.2)
PLATELET # BLD AUTO: 279 10*3/MM3 (ref 140–450)
PMV BLD AUTO: 11.4 FL (ref 6–12)
POTASSIUM SERPL-SCNC: 4.2 MMOL/L (ref 3.5–5.2)
PROT SERPL-MCNC: 6.6 G/DL (ref 6–8.5)
RBC # BLD AUTO: 4.84 10*6/MM3 (ref 3.77–5.28)
SODIUM SERPL-SCNC: 141 MMOL/L (ref 136–145)
T4 FREE SERPL-MCNC: 0.96 NG/DL (ref 0.93–1.7)
TRIGL SERPL-MCNC: 82 MG/DL (ref 0–150)
TSH SERPL DL<=0.05 MIU/L-ACNC: 2.78 UIU/ML (ref 0.27–4.2)
VIT B12 BLD-MCNC: 405 PG/ML (ref 211–946)
VLDLC SERPL-MCNC: 14 MG/DL (ref 5–40)
WBC NRBC COR # BLD: 7.57 10*3/MM3 (ref 3.4–10.8)

## 2023-02-22 PROCEDURE — 80050 GENERAL HEALTH PANEL: CPT

## 2023-02-22 PROCEDURE — 99214 OFFICE O/P EST MOD 30 MIN: CPT | Performed by: FAMILY MEDICINE

## 2023-02-22 PROCEDURE — 82306 VITAMIN D 25 HYDROXY: CPT

## 2023-02-22 PROCEDURE — 80061 LIPID PANEL: CPT

## 2023-02-22 PROCEDURE — 82607 VITAMIN B-12: CPT

## 2023-02-22 PROCEDURE — 83036 HEMOGLOBIN GLYCOSYLATED A1C: CPT

## 2023-02-22 PROCEDURE — 84439 ASSAY OF FREE THYROXINE: CPT

## 2023-02-22 RX ORDER — CHOLECALCIFEROL (VITAMIN D3) 125 MCG
500 CAPSULE ORAL DAILY
Qty: 30 TABLET | Refills: 3 | Status: SHIPPED | OUTPATIENT
Start: 2023-02-22

## 2023-02-22 RX ORDER — ATORVASTATIN CALCIUM 40 MG/1
40 TABLET, FILM COATED ORAL NIGHTLY
Qty: 30 TABLET | Refills: 0 | Status: SHIPPED | OUTPATIENT
Start: 2023-02-22

## 2023-02-22 RX ORDER — LOSARTAN POTASSIUM 50 MG/1
50 TABLET ORAL DAILY
Qty: 30 TABLET | Refills: 0 | Status: SHIPPED | OUTPATIENT
Start: 2023-02-22 | End: 2023-02-22

## 2023-02-22 RX ORDER — MELOXICAM 15 MG/1
15 TABLET ORAL DAILY
Qty: 30 TABLET | Refills: 5 | Status: SHIPPED | OUTPATIENT
Start: 2023-02-22 | End: 2023-03-22

## 2023-02-22 RX ORDER — ERGOCALCIFEROL 1.25 MG/1
50000 CAPSULE ORAL
Qty: 5 CAPSULE | Refills: 3 | Status: SHIPPED | OUTPATIENT
Start: 2023-02-22

## 2023-02-22 RX ORDER — LOSARTAN POTASSIUM 50 MG/1
50 TABLET ORAL DAILY
Qty: 30 TABLET | Refills: 3 | Status: SHIPPED | OUTPATIENT
Start: 2023-02-22

## 2023-02-22 RX ORDER — OMEPRAZOLE 40 MG/1
40 CAPSULE, DELAYED RELEASE ORAL DAILY
Qty: 30 CAPSULE | Refills: 3 | Status: SHIPPED | OUTPATIENT
Start: 2023-02-22

## 2023-02-22 NOTE — PATIENT INSTRUCTIONS
Get labwork fasting    Mammogram screening    Lung cancer screening - CT of chest    Refer to HORACIO Gregg for colonoscopy and EGD     Refer to HORACIO Lemus for pap smear    Restart losartan 50 mg daily    Monitor BP at home and bring readings on next visit on paper in morning and afternoone    Recheck in [address] [place] [address]

## 2023-02-24 ENCOUNTER — TELEPHONE (OUTPATIENT)
Dept: FAMILY MEDICINE CLINIC | Facility: CLINIC | Age: 63
End: 2023-02-24
Payer: COMMERCIAL

## 2023-02-24 NOTE — TELEPHONE ENCOUNTER
Gave pt results and recommendations. She voiced understanding and would like to wait on referral.

## 2023-02-24 NOTE — TELEPHONE ENCOUNTER
----- Message from Pete Burns MD sent at 2/24/2023  3:45 PM CST -----  Please let pt know that x-ray of right hand shows a small chip avulsion fracture oft he right middle finger. She will need to see hand specialist and there is one in [place]. If pt agreeable I will refer     Recheck on next visit thanks

## 2023-02-24 NOTE — TELEPHONE ENCOUNTER
----- Message from Pete Burns MD sent at 2/23/2023  7:22 AM CST -----  Please call pt    On CMP kidney function shows GFR in 80s and fasting sugar at 124.  Liver function is stable     On lipid panel LD high at 182 and total cholesterol high. Proceed with taking lipitor 40 mg PO qhs recommend heart healthy diet smoking cessation exercise if possible. Take with CoQ10 OTC to help reduce side effects of statin       CBC Shows stable hemoglobin and WBC    Vitamin D and b12 stable    Thyroid studies stable    Hga1c at 5.50 and pt is currently not diabetic or prediabetic    Recheck on next visit thanks

## 2023-03-22 ENCOUNTER — OFFICE VISIT (OUTPATIENT)
Dept: FAMILY MEDICINE CLINIC | Facility: CLINIC | Age: 63
End: 2023-03-22
Payer: COMMERCIAL

## 2023-03-22 VITALS
SYSTOLIC BLOOD PRESSURE: 150 MMHG | TEMPERATURE: 97.9 F | BODY MASS INDEX: 32.11 KG/M2 | HEART RATE: 90 BPM | WEIGHT: 199.8 LBS | OXYGEN SATURATION: 98 % | HEIGHT: 66 IN | DIASTOLIC BLOOD PRESSURE: 88 MMHG

## 2023-03-22 DIAGNOSIS — E78.2 MIXED HYPERLIPIDEMIA: ICD-10-CM

## 2023-03-22 DIAGNOSIS — M79.641 RIGHT HAND PAIN: ICD-10-CM

## 2023-03-22 DIAGNOSIS — Z72.0 TOBACCO USER: ICD-10-CM

## 2023-03-22 DIAGNOSIS — S62.91XG CLOSED FRACTURE OF RIGHT HAND WITH DELAYED HEALING, SUBSEQUENT ENCOUNTER: ICD-10-CM

## 2023-03-22 DIAGNOSIS — E66.9 CLASS 1 OBESITY WITH SERIOUS COMORBIDITY AND BODY MASS INDEX (BMI) OF 32.0 TO 32.9 IN ADULT, UNSPECIFIED OBESITY TYPE: ICD-10-CM

## 2023-03-22 DIAGNOSIS — I10 ESSENTIAL HYPERTENSION: ICD-10-CM

## 2023-03-22 DIAGNOSIS — M54.9 CHRONIC MIDLINE BACK PAIN, UNSPECIFIED BACK LOCATION: Primary | ICD-10-CM

## 2023-03-22 DIAGNOSIS — Z71.6 TOBACCO ABUSE COUNSELING: ICD-10-CM

## 2023-03-22 DIAGNOSIS — N18.2 STAGE 2 CHRONIC KIDNEY DISEASE: ICD-10-CM

## 2023-03-22 DIAGNOSIS — E53.8 VITAMIN B12 DEFICIENCY: ICD-10-CM

## 2023-03-22 DIAGNOSIS — G89.29 CHRONIC MIDLINE BACK PAIN, UNSPECIFIED BACK LOCATION: Primary | ICD-10-CM

## 2023-03-22 DIAGNOSIS — K21.9 GASTROESOPHAGEAL REFLUX DISEASE, UNSPECIFIED WHETHER ESOPHAGITIS PRESENT: ICD-10-CM

## 2023-03-22 DIAGNOSIS — R73.01 IMPAIRED FASTING GLUCOSE: ICD-10-CM

## 2023-03-22 DIAGNOSIS — B35.1 ONYCHOMYCOSIS: ICD-10-CM

## 2023-03-22 PROCEDURE — 3079F DIAST BP 80-89 MM HG: CPT | Performed by: FAMILY MEDICINE

## 2023-03-22 PROCEDURE — 3077F SYST BP >= 140 MM HG: CPT | Performed by: FAMILY MEDICINE

## 2023-03-22 RX ORDER — NICOTINE 10 MG
1 CARTRIDGE (EA) INHALATION AS NEEDED
Qty: 168 EACH | Refills: 3 | Status: SHIPPED | OUTPATIENT
Start: 2023-03-22

## 2023-03-22 RX ORDER — CHLORTHALIDONE 25 MG/1
25 TABLET ORAL DAILY
Qty: 30 TABLET | Refills: 3 | Status: SHIPPED | OUTPATIENT
Start: 2023-03-22

## 2023-03-22 RX ORDER — TERBINAFINE HYDROCHLORIDE 250 MG/1
250 TABLET ORAL DAILY
Qty: 30 TABLET | Refills: 2 | Status: SHIPPED | OUTPATIENT
Start: 2023-03-22

## 2023-03-22 RX ORDER — DULOXETIN HYDROCHLORIDE 30 MG/1
CAPSULE, DELAYED RELEASE ORAL
Qty: 60 CAPSULE | Refills: 3 | Status: SHIPPED | OUTPATIENT
Start: 2023-03-22

## 2023-03-22 NOTE — PATIENT INSTRUCTIONS
Please call OB/GYN with [facility] For pap smear     Please call Gastroenterology for appt for colonoscopy screening    Start on chlorthalidone 25 mg daily in addition to losartan 50 mg daily.  Continue with low sodium diet     Mammogram screening on June 2023     Refer to HORACIO Lozoya with Orthopedic for right hand pain/fracture.     If you take any medications OTC stick to tylenol as needed and Coricidin HBP for congestion    Get back x-ray and start on cymbalta 30 mg twice a day

## 2023-03-27 ENCOUNTER — TELEPHONE (OUTPATIENT)
Dept: FAMILY MEDICINE CLINIC | Facility: CLINIC | Age: 63
End: 2023-03-27
Payer: COMMERCIAL

## 2023-03-27 DIAGNOSIS — Z13.820 OSTEOPOROSIS SCREENING: Primary | ICD-10-CM

## 2023-03-27 NOTE — TELEPHONE ENCOUNTER
----- Message from Pete Burns MD sent at 3/24/2023  2:30 PM CDT -----  Pt has degenerative  changes of thoracic spine.  Has decreased osseous mineralization concerning for osteopenia or osteoporosis. Recommend DEXA scan if pt agreeable I will order at [facility]

## 2023-03-27 NOTE — TELEPHONE ENCOUNTER
----- Message from Pete Burns MD sent at 3/24/2023  2:29 PM CDT -----  Pt has degenerative changes of lumbar spine. Has decreased osseous mineralization concerning for osteopenia or osteoporosis. Recommend DEXA scan if pt agreeable I will order at imaging center

## 2023-04-03 NOTE — PROGRESS NOTES
Subjective:  Goldie Kraus is a 62 y.o. female who presents for       Patient Active Problem List   Diagnosis   • Need for vaccination   • Need for hepatitis C screening test   • Right arm pain   • Neck pain   • Essential hypertension   • Vaginal burning   • Obesity (BMI 30.0-34.9)   • Tobacco user   • Mixed hyperlipidemia   • Class 1 obesity with serious comorbidity and body mass index (BMI) of 32.0 to 32.9 in adult   • Gastroesophageal reflux disease   • Stage 2 chronic kidney disease   • Onychomycosis   • Class 1 obesity with serious comorbidity and body mass index (BMI) of 33.0 to 33.9 in adult   • Vitamin B12 deficiency   • Class 1 obesity with serious comorbidity and body mass index (BMI) of 31.0 to 31.9 in adult   • Impaired fasting glucose   • Osteopenia   • Coronary artery disease involving native coronary artery of native heart           Current Outpatient Medications:   •  atorvastatin (LIPITOR) 40 MG tablet, Take 1 tablet by mouth Every Night., Disp: 30 tablet, Rfl: 3  •  omeprazole (priLOSEC) 40 MG capsule, Take 1 capsule by mouth Daily., Disp: 30 capsule, Rfl: 3  •  terbinafine (LamISIL) 250 MG tablet, Take 1 tablet by mouth Daily., Disp: 30 tablet, Rfl: 2  •  vitamin B-12 (CYANOCOBALAMIN) 500 MCG tablet, Take 1 tablet by mouth Daily., Disp: 30 tablet, Rfl: 3  •  vitamin D (ERGOCALCIFEROL) 1.25 MG (89746 UT) capsule capsule, Take 1 capsule by mouth Every 7 (Seven) Days., Disp: 5 capsule, Rfl: 3  •  aspirin 81 MG EC tablet, Take 1 tablet by mouth Daily., Disp: 30 tablet, Rfl: 3  •  losartan (Cozaar) 100 MG tablet, Take 1 tablet by mouth Daily., Disp: 30 tablet, Rfl: 3     Pt is 61 yo male with management of HTN, HLP,GERD, obesity, tobacco smoker, hiatal hernia, sp tonsillectomy, vitamin B12 deficiency, CKD stage 2,  Osteopenia , back pain (arthritis lumbar spine), CAD      2/22/23 in office visit for recheck. Pt is due for mammogram and colonoscopy screening . bP is elevated today. She is not  taking her losartan. Pt also has hand pain. She continues to smoke tobacco about 1 ppd. No chest pain no dizziness.    3/22/23 in office visit for recheck. Pt had labwork on 2/22/23 that showed normal vitamin D and b12, thyroid studies were normal lipid panel showed total cholesterol at 256, LDL at 182. hga1c normal CMP showed glucose at 124. GFR at 83.9 and normal liver enzymes. CBC showed normal hemoglobin and WBC. Pt did have x-ray of right  hand on 2/22/23 that showed small chip avulsion fracture of middle finger proximal aspect of PIP joint. . Pt was ordered mammogram screening and lung cancer screening.  Her BP is high today she is taking losartan 50 mg daily.  She continues to have toenail fungus. She completed 12 weeks of lamisil. She also continues to have right handright middle finger pain she also has back pain in upper and middle back no history of back surgery       5/3/23 in office visit for recheck. Pt had DEXA scan that showed osteopenia. Also had mammogram screening that showed no evidence of malignancy. CT of chest was negative for pulmonary nodules. She does have coronary artery calcification  Pt had x-ray of thoracic spine and lumbar spine that showed degenerative changes along with decreased bone mineralization.. she stopped taking chlorthalidone after 2 days due to unspecified side effects. She does report intermittent chest pain and dyspnea on exertion.  She supposedly had a cardiac catheterization several years ago in [place] but do not have records. She had stress test in [place]  She does have family history of heart disease (father). She continues to smoke 1/2 ppd but is cutting back she could tolerate nicotrol inhaler. She stopped taking cymbalta for her back pain         Back Pain  This is a recurrent problem. The current episode started more than 1 month ago. The problem occurs constantly. The problem is unchanged. The pain is present in the lumbar spine and thoracic spine.  The quality of the pain is described as aching. The pain is at a severity of 6/10. The pain is moderate. The pain is the same all the time. The symptoms are aggravated by bending, lying down and position. Associated symptoms include chest pain and weakness. Pertinent negatives include no abdominal pain, bladder incontinence, bowel incontinence, dysuria, fever, headaches, leg pain, paresis, paresthesias, pelvic pain, perianal numbness or weight loss. She has tried NSAIDs for the symptoms. The treatment provided no relief.   Chest Pain   This is a recurrent problem. The current episode started more than 1 month ago. The onset quality is gradual. The problem occurs constantly. The problem has been waxing and waning. The pain is present in the substernal region. The pain is at a severity of 0/10. The patient is experiencing no pain. Associated symptoms include back pain, shortness of breath and weakness. Pertinent negatives include no abdominal pain, claudication, cough, diaphoresis, dizziness, exertional chest pressure, fever, headaches, hemoptysis, irregular heartbeat, leg pain, lower extremity edema, malaise/fatigue, nausea, near-syncope, orthopnea, palpitations, PND, sputum production, syncope or vomiting. The pain is aggravated by nothing. She has tried nothing for the symptoms. The treatment provided mild relief. Risk factors include lack of exercise, obesity, sedentary lifestyle, post-menopausal and smoking/tobacco exposure.   Her past medical history is significant for CAD, hyperlipidemia and hypertension.   Pertinent negatives for past medical history include no aneurysm, no anxiety/panic attacks, no aortic aneurysm, no aortic dissection, no arrhythmia, no bicuspid aortic valve, no cancer, no congenital heart disease, no connective tissue disease, no COPD, no CHF, no diabetes, no DVT, no hyperhomocysteinemia, no Kawasaki disease, no Marfan's syndrome, no MI, no mitral valve prolapse, no pacemaker, no PE, no  PVD, no recent injury, no rheumatic fever, no seizures, no sickle cell disease, no sleep apnea, no spontaneous pneumothorax, no stimulant use, no thyroid problem, no TIA, Vega syndrome and no valve disorder.   Her family medical history is significant for CAD.   Pertinent negatives for family medical history include: no aortic dissection, no connective tissue disease, no diabetes, no heart disease, no hyperlipidemia, no hypertension, no Marfan's syndrome, no early MI, no PE, no PVD, no sickle cell disease, no stroke and no sudden death.   Chronic Kidney Disease  This is a chronic problem. The current episode started more than 1 year ago. The problem occurs constantly. The problem has been unchanged. Pertinent negatives include no abdominal pain, anorexia, arthralgias, change in bowel habit, chest pain, chills, congestion, coughing, diaphoresis, fatigue, fever, headaches, joint swelling, myalgias, nausea, neck pain, numbness, rash, sore throat, swollen glands, urinary symptoms, vertigo, visual change, vomiting or weakness. Nothing aggravates the symptoms. She has tried nothing for the symptoms. The treatment provided no relief.   Heartburn  She complains of early satiety and heartburn. She reports no abdominal pain, no belching, no chest pain, no choking, no coughing, no dysphagia, no globus sensation, no hoarse voice, no nausea, no sore throat or no wheezing. This is a chronic problem. The current episode started more than 1 year ago. The heartburn is of moderate intensity. The heartburn does not wake her from sleep. The heartburn does not limit her activity. The heartburn doesn't change with position. Associated symptoms include fatigue. She has tried a PPI for the symptoms. The treatment provided moderate relief. Past procedures do not include an abdominal ultrasound, an EGD, esophageal manometry, H. pylori antibody titer or a UGI.   Obesity  This is a chronic problem. The current episode started more than 1  year ago. Associated symptoms include fatigue, numbness and weakness. Pertinent negatives include no abdominal pain, chest pain, chills, congestion, coughing, diaphoresis, fever, headaches, nausea, sore throat or vomiting. Nothing aggravates the symptoms. She has tried nothing for the symptoms. The treatment provided no relief.   Hypertension  This is a chronic problem. The current episode started more than 1 year ago. The problem is unchanged. Pertinent negatives include no chest pain, headaches or shortness of breath. Risk factors for coronary artery disease include obesity, sedentary lifestyle and smoking/tobacco exposure. Past treatments include angiotensin blockers chlorthalidone  Current antihypertension treatment includes angiotensin blockers. There is no history of angina, kidney disease, CAD/MI, CVA, heart failure or left ventricular hypertrophy. There is no history of chronic renal disease, coarctation of the aorta, hyperaldosteronism, hypercortisolism, hyperparathyroidism, a hypertension causing med, pheochromocytoma, renovascular disease, sleep apnea or a thyroid problem.   Hyperlipidemia  This is a chronic problem. The current episode started more than 1 year ago. The problem is uncontrolled. Recent lipid tests were reviewed and are variable. Exacerbating diseases include obesity. She has no history of chronic renal disease, diabetes, hypothyroidism, liver disease or nephrotic syndrome. Pertinent negatives include no chest pain or shortness of breath. Current antihyperlipidemic treatment includes statins. Compliance problems include adherence to diet.  Risk factors for coronary artery disease include obesity, a sedentary lifestyle, dyslipidemia and post-menopausal.    Review of Systems  Review of Systems   Constitutional: Positive for activity change and fatigue. Negative for appetite change, chills, diaphoresis, fever, malaise/fatigue and weight loss.   HENT: Negative for congestion, postnasal drip,  rhinorrhea, sinus pressure, sinus pain, sneezing, sore throat, trouble swallowing and voice change.    Respiratory: Positive for chest tightness and shortness of breath. Negative for cough, hemoptysis, sputum production, choking, wheezing and stridor.    Cardiovascular: Positive for chest pain. Negative for palpitations, orthopnea, claudication, syncope, PND and near-syncope.   Gastrointestinal: Negative for abdominal pain, bowel incontinence, diarrhea, nausea and vomiting.   Genitourinary: Negative for bladder incontinence, dysuria and pelvic pain.   Musculoskeletal: Positive for arthralgias and back pain.   Neurological: Positive for weakness. Negative for dizziness, seizures, headaches and paresthesias.       Patient Active Problem List   Diagnosis   • Need for vaccination   • Need for hepatitis C screening test   • Right arm pain   • Neck pain   • Essential hypertension   • Vaginal burning   • Obesity (BMI 30.0-34.9)   • Tobacco user   • Mixed hyperlipidemia   • Class 1 obesity with serious comorbidity and body mass index (BMI) of 32.0 to 32.9 in adult   • Gastroesophageal reflux disease   • Stage 2 chronic kidney disease   • Onychomycosis   • Class 1 obesity with serious comorbidity and body mass index (BMI) of 33.0 to 33.9 in adult   • Vitamin B12 deficiency   • Class 1 obesity with serious comorbidity and body mass index (BMI) of 31.0 to 31.9 in adult   • Impaired fasting glucose   • Osteopenia   • Coronary artery disease involving native coronary artery of native heart     Past Surgical History:   Procedure Laterality Date   • CARDIAC CATHETERIZATION  07/01/2015    Noncritical CAD with no flow limiting lesions at this time. Normal LV systolic function with Ef of 60% with no wall motion abnormalities.     Social History     Socioeconomic History   • Marital status:    Tobacco Use   • Smoking status: Every Day     Packs/day: 1.00     Types: Cigarettes   • Smokeless tobacco: Never   Vaping Use   •  Vaping Use: Never used   Substance and Sexual Activity   • Alcohol use: No   • Drug use: Defer   • Sexual activity: Defer     History reviewed. No pertinent family history.  Lab on 02/22/2023   Component Date Value Ref Range Status   • WBC 02/22/2023 7.57  3.40 - 10.80 10*3/mm3 Final   • RBC 02/22/2023 4.84  3.77 - 5.28 10*6/mm3 Final   • Hemoglobin 02/22/2023 14.9  12.0 - 15.9 g/dL Final   • Hematocrit 02/22/2023 43.7  34.0 - 46.6 % Final   • MCV 02/22/2023 90.3  79.0 - 97.0 fL Final   • MCH 02/22/2023 30.8  26.6 - 33.0 pg Final   • MCHC 02/22/2023 34.1  31.5 - 35.7 g/dL Final   • RDW 02/22/2023 12.1 (L)  12.3 - 15.4 % Final   • RDW-SD 02/22/2023 39.5  37.0 - 54.0 fl Final   • MPV 02/22/2023 11.4  6.0 - 12.0 fL Final   • Platelets 02/22/2023 279  140 - 450 10*3/mm3 Final   • Neutrophil % 02/22/2023 57.4  42.7 - 76.0 % Final   • Lymphocyte % 02/22/2023 32.0  19.6 - 45.3 % Final   • Monocyte % 02/22/2023 5.5  5.0 - 12.0 % Final   • Eosinophil % 02/22/2023 3.6  0.3 - 6.2 % Final   • Basophil % 02/22/2023 1.2  0.0 - 1.5 % Final   • Immature Grans % 02/22/2023 0.3  0.0 - 0.5 % Final   • Neutrophils, Absolute 02/22/2023 4.35  1.70 - 7.00 10*3/mm3 Final   • Lymphocytes, Absolute 02/22/2023 2.42  0.70 - 3.10 10*3/mm3 Final   • Monocytes, Absolute 02/22/2023 0.42  0.10 - 0.90 10*3/mm3 Final   • Eosinophils, Absolute 02/22/2023 0.27  0.00 - 0.40 10*3/mm3 Final   • Basophils, Absolute 02/22/2023 0.09  0.00 - 0.20 10*3/mm3 Final   • Immature Grans, Absolute 02/22/2023 0.02  0.00 - 0.05 10*3/mm3 Final   • nRBC 02/22/2023 0.0  0.0 - 0.2 /100 WBC Final   • Glucose 02/22/2023 124 (H)  65 - 99 mg/dL Final   • BUN 02/22/2023 9  8 - 23 mg/dL Final   • Creatinine 02/22/2023 0.80  0.57 - 1.00 mg/dL Final   • Sodium 02/22/2023 141  136 - 145 mmol/L Final   • Potassium 02/22/2023 4.2  3.5 - 5.2 mmol/L Final   • Chloride 02/22/2023 106  98 - 107 mmol/L Final   • CO2 02/22/2023 24.0  22.0 - 29.0 mmol/L Final   • Calcium 02/22/2023 9.6  8.6  - 10.5 mg/dL Final   • Total Protein 02/22/2023 6.6  6.0 - 8.5 g/dL Final   • Albumin 02/22/2023 4.4  3.5 - 5.2 g/dL Final   • ALT (SGPT) 02/22/2023 14  1 - 33 U/L Final   • AST (SGOT) 02/22/2023 18  1 - 32 U/L Final   • Alkaline Phosphatase 02/22/2023 97  39 - 117 U/L Final   • Total Bilirubin 02/22/2023 0.7  0.0 - 1.2 mg/dL Final   • Globulin 02/22/2023 2.2  gm/dL Final   • A/G Ratio 02/22/2023 2.0  g/dL Final   • BUN/Creatinine Ratio 02/22/2023 11.3  7.0 - 25.0 Final   • Anion Gap 02/22/2023 11.0  5.0 - 15.0 mmol/L Final   • eGFR 02/22/2023 83.9  >60.0 mL/min/1.73 Final   • Hemoglobin A1C 02/22/2023 5.50  4.80 - 5.60 % Final   • Total Cholesterol 02/22/2023 256 (H)  0 - 200 mg/dL Final   • Triglycerides 02/22/2023 82  0 - 150 mg/dL Final   • HDL Cholesterol 02/22/2023 60  40 - 60 mg/dL Final   • LDL Cholesterol  02/22/2023 182 (H)  0 - 100 mg/dL Final   • VLDL Cholesterol 02/22/2023 14  5 - 40 mg/dL Final   • LDL/HDL Ratio 02/22/2023 2.99   Final   • TSH 02/22/2023 2.780  0.270 - 4.200 uIU/mL Final   • Free T4 02/22/2023 0.96  0.93 - 1.70 ng/dL Final   • 25 Hydroxy, Vitamin D 02/22/2023 37.2  30.0 - 100.0 ng/ml Final   • Vitamin B-12 02/22/2023 405  211 - 946 pg/mL Final      XR Spine Thoracic 3 View  Narrative: PROCEDURE: XR SPINE THORACIC 3 VW    VIEWS:  3    INDICATION:  Pain    COMPARISON:  None    FINDINGS:       - Fracture(s): None    - Alignment:  Within normal limits      - Disc space heights: Within normal limits for age      - Focal osteolytic/blastic: None      - Bone density:  Diffusely decreased osseous mineralization     - Misc.: Small anterior osteophytes are present multiple levels  Impression: No acute osseous abnormality identified. Diffusely  decreased osseous mineralization..     If pain or symptoms persist beyond reasonable expectations,   an MRI examination is suggested as is deemed clinically  appropriate.    Electronically signed by:  Sonia Meyer MD  3/24/2023 1:37 PM CDT  Workstation:  "[id number]  XR Spine Lumbar Complete 4+VW  Narrative: PROCEDURE: XR SPINE LUMBAR COMPLETE 4+VW    VIEWS:  5    INDICATION:  Pain    COMPARISON:  None    FINDINGS:       - Fracture(s): None    - Alignment:  Within normal limits      - Disc space heights: Narrowed at L1-L2, L4-L5, L5-S1.    - Focal osteolytic/blastic: None      - Bone density:  Fusion decreased osseous mineralization     - Misc.:  Degenerative lower lumbar facet changes  Impression: Degenerative changes as above. No acute osseous  abnormality identified. Diffusely decreased osseous  mineralization    If pain or symptoms persist beyond reasonable expectations,   an MRI examination is suggested as is deemed clinically  appropriate.    Electronically signed by:  Sonia Meyer MD  3/24/2023 1:36 PM CDT  Workstation: [id number]    [email or web address]  [employer] History   Administered Date(s) Administered   • Tdap 01/23/2017       The following portions of the patient's history were reviewed and updated as appropriate: allergies, current medications, past family history, past medical history, past social history, past surgical history and problem list.        Physical Exam  /96 (BP Location: Left arm, Patient Position: Sitting, Cuff Size: Adult)   Pulse 92   Temp 98.2 °F (36.8 °C)   Ht 167.6 cm (66\")   Wt 87.5 kg (193 lb)   LMP 05/23/2010 (LMP Unknown)   SpO2 96%   BMI 31.15 kg/m²   EKG on 5/3/23 showed NSR with right atrial enlargement borderline EKG   Physical Exam  Vitals and nursing note reviewed.   Constitutional:       Appearance: She is well-developed. She is not diaphoretic.   HENT:      Head: Normocephalic and atraumatic.      Right Ear: External ear normal.   Eyes:      Conjunctiva/sclera: Conjunctivae normal.      Pupils: Pupils are equal, round, and reactive to light.   Cardiovascular:      Rate and Rhythm: Normal rate and regular rhythm.      Heart sounds: Normal heart sounds. No murmur heard.  Pulmonary:      Effort: Pulmonary " effort is normal. No respiratory distress.      Breath sounds: Normal breath sounds.   Abdominal:      General: Bowel sounds are normal. There is no distension.      Palpations: Abdomen is soft.      Tenderness: There is no abdominal tenderness.   Musculoskeletal:         General: Tenderness present. No deformity.      Cervical back: Normal range of motion and neck supple. No tenderness or bony tenderness. No pain with movement.      Thoracic back: Spasms and bony tenderness present. Decreased range of motion.      Lumbar back: Tenderness and bony tenderness present. Decreased range of motion.   Skin:     General: Skin is warm.      Coloration: Skin is not pale.      Findings: No erythema or rash.   Neurological:      Mental Status: She is alert and oriented to person, place, and time.      Cranial Nerves: No cranial nerve deficit.   Psychiatric:         Behavior: Behavior normal.         [unfilled]   Diagnosis Plan   1. Chest pain, unspecified type  Ambulatory Referral to Cardiology      2. Tobacco user  CBC Auto Differential    Comprehensive Metabolic Panel    Lipid Panel      3. Stage 2 chronic kidney disease  CBC Auto Differential    Comprehensive Metabolic Panel    Lipid Panel      4. Vitamin B12 deficiency  CBC Auto Differential    Comprehensive Metabolic Panel    Lipid Panel      5. Mixed hyperlipidemia  CBC Auto Differential    Comprehensive Metabolic Panel    Lipid Panel      6. Essential hypertension  CBC Auto Differential    Comprehensive Metabolic Panel    Lipid Panel      7. Gastroesophageal reflux disease, unspecified whether esophagitis present  CBC Auto Differential    Comprehensive Metabolic Panel    Lipid Panel      8. Osteopenia, unspecified location  CBC Auto Differential    Comprehensive Metabolic Panel    Lipid Panel      9. Coronary artery disease involving native coronary artery of native heart, unspecified whether angina present  ECG 12 Lead    CBC Auto Differential    Comprehensive  Metabolic Panel    Lipid Panel    Ambulatory Referral to Cardiology      10. Gastroesophageal reflux disease  omeprazole (priLOSEC) 40 MG capsule    CBC Auto Differential    Comprehensive Metabolic Panel    Lipid Panel      11. Dyspnea on exertion  Ambulatory Referral to Cardiology                      -went over labwork   -recommend colonoscopy screening - referred to GI   -recommend COVID-19 vaccination   -recommend shingles/pneumonia vaccination -pt declined   -recommend pap smear  - refer to OB/GYN   -recommend black cohosh   -CAD - reviewed CT of [facility] EKG today showed on NSR right atrial enlargement and borderline EKG. Refer to Cardiology. Supposedly had cardiac cath in [facility] do not have records on file. Start on aspirin 81 mg daily.   -back pain/arthritis of lumbar spine - reviewed x-ray of lumbar/thoracic spine  Was on cymbalta 30 mg PO BID.   -impaired fasting glucose - continue to monitor.  Recommend watching sugar and carb intake   -right hand pain/injury - recent x-ray shows old avulsion fracturel will refer to [facility]. May need to see Hand surgeon  -osteopenia - went over DEXA scan. Recommend citracal with Vitamin D, advised pt to quit smoking gave information today   -tobacco smoker - counseled quit smoking >5 minutes recommend 1 800 QUIT NOW. Recommend nicotine patches  -onychomycosis - was on lamisil 250 mg daily for 12 weeks.restart on lamisil   -itchy ears -recommend baby oil    -CKD stage 2. - continue to monitor.information provided today   -HTN -.go up on losartan from 50 to 100 mg daily. Could not tolerate chlorthalidonee    Recommend low sodium diet   -HLP -on lipitor 40 mg PO qhs. Recommend heart healthy diet   -obesity - BMI at 32.25 ecommend weight loss >5 minutes   -tobacco user - cousneled quit smoking >5 minutes recommend 1 800 QUIT NOW.recommend nicotine patch  -GERD - on prilosec 40 mg daily.  refer to GI   -advised pt to be safe and call with questions and  concerns  -advised pt to go to ER or call 911 if symptoms worrisome or severe  -advised pt to followup with specialist and referrals  -advised pt to be safe during COVID-19 pandemic  I spent 35  minutes caring for Goldie on this date of service. This time includes time spent by me in the following activities: preparing for the visit, reviewing tests, obtaining and/or reviewing a separately obtained history, performing a medically appropriate examination and/or evaluation, counseling and educating the patient/family/caregiver, ordering medications, tests, or procedures, referring and communicating with other health care professionals, documenting information in the medical record, independently interpreting results and communicating that information with the patient/family/caregiver and care coordination.           This document has been electronically signed by Pete Burns MD on May 3, 2023 08:46 CDT

## 2023-04-26 ENCOUNTER — TELEPHONE (OUTPATIENT)
Dept: FAMILY MEDICINE CLINIC | Facility: CLINIC | Age: 63
End: 2023-04-26
Payer: COMMERCIAL

## 2023-04-26 PROBLEM — M85.80 OSTEOPENIA: Status: ACTIVE | Noted: 2023-04-26

## 2023-04-26 NOTE — TELEPHONE ENCOUNTER
----- Message from Pete Burns MD sent at 4/25/2023  7:47 AM CDT -----  Regarding: mammogram, CT of chest and DEXA scan  Please call pt    Mammogram screening on 4/20/21 shows no radiographic evidence of malignancy repeat in 1 year    CT of chest on 4/20/23 shows no suspicious pulmonary nodules. Recommend repeat CT of chest in 1 year     DEXA scan on 4/20/23 shows osteopenia of hip and lumbar spine. Recommend pt cut back on smoking. Recommend citracal OTC with vitamin D for bone support.  Recommend proper diet and regular exercise. Will discuss on next visit    Recheck on next visit thanks

## 2023-05-01 DIAGNOSIS — Z13.820 OSTEOPOROSIS SCREENING: ICD-10-CM

## 2023-05-01 DIAGNOSIS — Z12.31 SCREENING MAMMOGRAM, ENCOUNTER FOR: ICD-10-CM

## 2023-05-01 DIAGNOSIS — Z12.2 SCREENING FOR LUNG CANCER: ICD-10-CM

## 2023-05-03 ENCOUNTER — OFFICE VISIT (OUTPATIENT)
Dept: FAMILY MEDICINE CLINIC | Facility: CLINIC | Age: 63
End: 2023-05-03
Payer: COMMERCIAL

## 2023-05-03 VITALS
TEMPERATURE: 98.2 F | HEIGHT: 66 IN | HEART RATE: 92 BPM | DIASTOLIC BLOOD PRESSURE: 96 MMHG | BODY MASS INDEX: 31.02 KG/M2 | SYSTOLIC BLOOD PRESSURE: 148 MMHG | OXYGEN SATURATION: 96 % | WEIGHT: 193 LBS

## 2023-05-03 DIAGNOSIS — K21.9 GASTROESOPHAGEAL REFLUX DISEASE: ICD-10-CM

## 2023-05-03 DIAGNOSIS — Z72.0 TOBACCO USER: ICD-10-CM

## 2023-05-03 DIAGNOSIS — E78.2 MIXED HYPERLIPIDEMIA: ICD-10-CM

## 2023-05-03 DIAGNOSIS — E53.8 VITAMIN B12 DEFICIENCY: ICD-10-CM

## 2023-05-03 DIAGNOSIS — N18.2 STAGE 2 CHRONIC KIDNEY DISEASE: ICD-10-CM

## 2023-05-03 DIAGNOSIS — R07.9 CHEST PAIN, UNSPECIFIED TYPE: Primary | ICD-10-CM

## 2023-05-03 DIAGNOSIS — I10 ESSENTIAL HYPERTENSION: ICD-10-CM

## 2023-05-03 DIAGNOSIS — K21.9 GASTROESOPHAGEAL REFLUX DISEASE, UNSPECIFIED WHETHER ESOPHAGITIS PRESENT: ICD-10-CM

## 2023-05-03 DIAGNOSIS — M85.80 OSTEOPENIA, UNSPECIFIED LOCATION: ICD-10-CM

## 2023-05-03 DIAGNOSIS — R06.09 DYSPNEA ON EXERTION: ICD-10-CM

## 2023-05-03 DIAGNOSIS — I25.10 CORONARY ARTERY DISEASE INVOLVING NATIVE CORONARY ARTERY OF NATIVE HEART, UNSPECIFIED WHETHER ANGINA PRESENT: ICD-10-CM

## 2023-05-03 LAB
QT INTERVAL: 364 MS
QTC INTERVAL: 438 MS

## 2023-05-03 RX ORDER — TERBINAFINE HYDROCHLORIDE 250 MG/1
250 TABLET ORAL DAILY
Qty: 30 TABLET | Refills: 2 | Status: SHIPPED | OUTPATIENT
Start: 2023-05-03

## 2023-05-03 RX ORDER — LOSARTAN POTASSIUM 100 MG/1
100 TABLET ORAL DAILY
Qty: 30 TABLET | Refills: 3 | Status: SHIPPED | OUTPATIENT
Start: 2023-05-03

## 2023-05-03 RX ORDER — LOSARTAN POTASSIUM 50 MG/1
50 TABLET ORAL DAILY
Qty: 30 TABLET | Refills: 3 | Status: CANCELLED | OUTPATIENT
Start: 2023-05-03

## 2023-05-03 RX ORDER — CHOLECALCIFEROL (VITAMIN D3) 125 MCG
500 CAPSULE ORAL DAILY
Qty: 30 TABLET | Refills: 3 | Status: SHIPPED | OUTPATIENT
Start: 2023-05-03

## 2023-05-03 RX ORDER — OMEPRAZOLE 40 MG/1
40 CAPSULE, DELAYED RELEASE ORAL DAILY
Qty: 30 CAPSULE | Refills: 3 | Status: SHIPPED | OUTPATIENT
Start: 2023-05-03

## 2023-05-03 RX ORDER — ASPIRIN 81 MG/1
81 TABLET ORAL DAILY
Qty: 30 TABLET | Refills: 3 | Status: SHIPPED | OUTPATIENT
Start: 2023-05-03

## 2023-05-03 RX ORDER — ERGOCALCIFEROL 1.25 MG/1
50000 CAPSULE ORAL
Qty: 5 CAPSULE | Refills: 3 | Status: SHIPPED | OUTPATIENT
Start: 2023-05-03

## 2023-05-03 RX ORDER — ATORVASTATIN CALCIUM 40 MG/1
40 TABLET, FILM COATED ORAL NIGHTLY
Qty: 30 TABLET | Refills: 3 | Status: SHIPPED | OUTPATIENT
Start: 2023-05-03

## 2023-05-03 NOTE — PATIENT INSTRUCTIONS
Refer to Cardiology with Dr. Davila for chest pain    Go up on losartan from 50 to 100 mg daily. Monitor blood pressure at home    Recheck in 6 weeks    Citracal with vitamin for bone health     Labwork before next visit  fasting

## 2023-06-02 DIAGNOSIS — M79.644 FINGER PAIN, RIGHT: Primary | ICD-10-CM

## 2023-07-06 PROBLEM — R07.2 PRECORDIAL PAIN: Status: ACTIVE | Noted: 2023-07-06

## 2023-08-09 DIAGNOSIS — E04.1 THYROID NODULE: Primary | ICD-10-CM

## 2023-08-10 ENCOUNTER — TELEPHONE (OUTPATIENT)
Dept: FAMILY MEDICINE CLINIC | Facility: CLINIC | Age: 63
End: 2023-08-10

## 2023-08-10 NOTE — TELEPHONE ENCOUNTER
----- Message from Pete Burns MD sent at 8/9/2023  9:55 AM CDT -----  Regarding: US neck  Please let pt know that US of neck on 6/28/23 shows small hypoechoic nodules in left thyroid lobe that measures about 6 mm in size.  It is recommended 8-12 month followup US to assess size and stability I will order at imaging center to be checked in April 2024     There is normal appearing nodes in the neck right and left side    There is probable area of muscular tissues at site indicated at base of skull    Recheck on next visit thanks

## 2023-08-17 DIAGNOSIS — E04.1 THYROID NODULE: ICD-10-CM

## 2023-08-19 DIAGNOSIS — E04.1 THYROID NODULE: Primary | ICD-10-CM

## 2023-08-22 ENCOUNTER — TELEPHONE (OUTPATIENT)
Dept: FAMILY MEDICINE CLINIC | Facility: CLINIC | Age: 63
End: 2023-08-22

## 2023-08-22 NOTE — TELEPHONE ENCOUNTER
Called pt to go over lab results. Pt did not answer but I was able to go over the pt's lab results through the phone. I also notified the pt to call us back to see if they would like to be referred for a follow up US and a CT scan of neck.

## 2023-08-22 NOTE — TELEPHONE ENCOUNTER
Called pt about their recent lab results. Pt voiced understanding pt agreed to Follow up US and CT if needed. Pt said they would like to get referred to [facility] for her US and CT if possible.

## 2023-08-22 NOTE — TELEPHONE ENCOUNTER
Called pt about their recent lab results. Pt agreed to do a follow up US of thyroid at [facility] if possible. And also

## 2023-08-22 NOTE — TELEPHONE ENCOUNTER
----- Message from Pete Burns MD sent at 8/19/2023  4:20 PM CDT -----  Pt has thyroid nodules in left lobe that is 6 mm in size. This is not a simple cyst. A repeat US of thyroid in 8-12 months is recommended    I will order at imaging center    Has normal appearing lymph nodes    There is probable area of muscular tissues at base of skull. If there is continued concern will order CT of neck    Recheck on next visit